# Patient Record
Sex: MALE | Race: WHITE
[De-identification: names, ages, dates, MRNs, and addresses within clinical notes are randomized per-mention and may not be internally consistent; named-entity substitution may affect disease eponyms.]

---

## 2017-03-24 ENCOUNTER — HOSPITAL ENCOUNTER (OUTPATIENT)
Dept: HOSPITAL 80 - FED | Age: 62
Setting detail: OBSERVATION
LOS: 1 days | Discharge: HOME | End: 2017-03-25
Attending: FAMILY MEDICINE | Admitting: INTERNAL MEDICINE
Payer: COMMERCIAL

## 2017-03-24 DIAGNOSIS — K44.9: ICD-10-CM

## 2017-03-24 DIAGNOSIS — R10.13: Primary | ICD-10-CM

## 2017-03-24 DIAGNOSIS — K21.0: ICD-10-CM

## 2017-03-24 DIAGNOSIS — R11.0: ICD-10-CM

## 2017-03-24 LAB
% IMMATURE GRANULYOCYTES: 0.1 % (ref 0–1.1)
ABSOLUTE IMMATURE GRANULOCYTES: 0.01 10^3/UL (ref 0–0.1)
ABSOLUTE NRBC COUNT: 0 10^3/UL (ref 0–0.01)
ADD DIFF?: NO
ADD MORPH?: NO
ADD SCAN?: NO
ALBUMIN SERPL-MCNC: 3.7 G/DL (ref 3.5–5)
ALP SERPL-CCNC: 48 IU/L (ref 38–126)
ALT SERPL-CCNC: 35 IU/L (ref 21–72)
ANION GAP SERPL CALC-SCNC: 10 MEQ/L (ref 8–16)
AST SERPL-CCNC: 23 IU/L (ref 17–59)
ATYPICAL LYMPHOCYTE FLAG: 20 (ref 0–99)
BILIRUB SERPL-MCNC: 0.7 MG/DL (ref 0.1–1.4)
BILIRUBIN-CONJUGATED: 0.4 MG/DL (ref 0–0.5)
BILIRUBIN-UNCONJUGATED: 0.3 MG/DL (ref 0–1.1)
CALCIUM SERPL-MCNC: 9.6 MG/DL (ref 8.5–10.4)
CHLORIDE SERPL-SCNC: 109 MEQ/L (ref 97–110)
CO2 SERPL-SCNC: 19 MEQ/L (ref 22–31)
CREAT SERPL-MCNC: 0.8 MG/DL (ref 0.7–1.3)
ERYTHROCYTE [DISTWIDTH] IN BLOOD BY AUTOMATED COUNT: 12.3 % (ref 11.5–15.2)
FRAGMENT RBC FLAG: 0 (ref 0–99)
GFR SERPL CREATININE-BSD FRML MDRD: > 60 ML/MIN/{1.73_M2}
GLUCOSE SERPL-MCNC: 113 MG/DL (ref 70–100)
HCT VFR BLD CALC: 42.4 % (ref 40–51)
HGB BLD-MCNC: 15.1 G/DL (ref 13.7–17.5)
LEFT SHIFT FLG: 0 (ref 0–99)
LIPEMIA HEMOLYSIS FLAG: 90 (ref 0–99)
MCH RBC BLDCO QN: 31.9 PG (ref 27.9–34.1)
MCHC RBC AUTO-ENTMCNC: 35.6 G/DL (ref 32.4–36.7)
MCV RBC AUTO: 89.6 FL (ref 81.5–99.8)
NRBC-AUTO%: 0 % (ref 0–0.2)
PLATELET # BLD: 293 10^3/UL (ref 150–400)
PLATELET CLUMPS FLAG: 0 (ref 0–99)
PMV BLD AUTO: 10.5 FL (ref 8.7–11.7)
POTASSIUM SERPL-SCNC: 3.6 MEQ/L (ref 3.5–5.2)
PROT SERPL-MCNC: 6.2 G/DL (ref 6.3–8.2)
RBC # BLD AUTO: 4.73 10^6/UL (ref 4.4–6.38)
SODIUM SERPL-SCNC: 138 MEQ/L (ref 134–144)
TROPONIN I SERPL-MCNC: < 0.012 NG/ML (ref 0–0.03)

## 2017-03-24 PROCEDURE — 74177 CT ABD & PELVIS W/CONTRAST: CPT

## 2017-03-24 PROCEDURE — G0378 HOSPITAL OBSERVATION PER HR: HCPCS

## 2017-03-24 PROCEDURE — 93005 ELECTROCARDIOGRAM TRACING: CPT

## 2017-03-24 PROCEDURE — 71020: CPT

## 2017-03-24 NOTE — EDPHY
H & P


Stated Complaint: N and V today, severe GERD, recent GERD med change


Time Seen by Provider: 03/24/17 22:47


HPI/ROS: 





CHIEF COMPLAINT:  Heartburn





HISTORY OF PRESENT ILLNESS:  The patient is a 61-year-old man who comes to the 

emergency department complaining of heartburn and vomiting. He states that he 

has been suffering with this for several months.  He has been worked up by Dr. Granda who performed an upper endoscopy that revealed a small esophageal ulcer.  

They also performed a CT scan of his chest and abdomen which was unremarkable 

except for multiple renal cysts. He was initially on Protonix which helped but 

then stopped working.  He was switched to Nexium and Carafate yesterday.  Every 

time he takes Carafate he becomes very nauseous.  This evening after his 3rd 

dose he began vomiting. He denies any chest pain or shortness of breath. He has 

never had any cardiac workup.  He denies any significant past medical history.  

His wife states that the symptoms do seem to sometimes worsen when he exercises 

but not consistently.








REVIEW OF SYSTEMS:


Constitutional:  denies: chills, fever, recent illness, recent injury


EENTM: denies: blurred vision, double vision, nose congestion


Respiratory: denies: cough, shortness of breath


Cardiac: denies: chest pain, irregular heart rate, lightheadedness, palpitations


Gastrointestinal/Abdominal:  See HPI


Genitourinary: denies: dysuria, frequency, hematuria, pain


Musculoskeletal: denies: joint pain, muscle pain


Skin: denies: lesions, rash, jaundice, bruising


Neurological: denies: headache, numbness, paresthesia, tingling, dizziness, 

weakness


Hematologic/Lymphatic: denies: blood clots, easy bleeding, easy bruising


Immunologic/allergic: denies: HIV/AIDS, transplant








EXAM:


GENERAL:  Mild distress, nauseous


HEAD:  Atraumatic, normocephalic.


EYES:  Pupils equal round and reactive to light, extraocular movements intact, 

sclera anicteric, conjunctiva are normal.


ENT:  TMs normal, nares patent, oropharynx clear without exudates.  Moist 

mucous membranes.


NECK:  Normal range of motion, supple without lymphadenopathy or JVD.


LUNGS:  Breath sounds clear to auscultation bilaterally and equal.  No wheezes 

rales or rhonchi.


HEART:  Regular rate and rhythm without murmurs, rubs or gallops.


ABDOMEN:  Soft, nontender, normoactive bowel sounds.  No guarding, no rebound.  

No masses appreciated.


BACK:  No CVA tenderness, no spinal tenderness, step-offs or deformities


EXTREMITIES:  Normal range of motion, no pitting or edema.  No clubbing or 

cyanosis.


NEUROLOGICAL:  Cranial nerves II through XII grossly intact.  Normal speech, 

normal gait.  5/5 strength, normal movement in all extremities, normal sensation


PSYCH:  Normal mood, normal affect.


SKIN:  Warm, dry, normal turgor, no visible rashes or lesions.








Source: Patient


Exam Limitations: No limitations





- Personal History


Current Tetanus/Diphtheria Vaccine: Yes


Current Tetanus Diphtheria and Acellular Pertussis (TDAP): Yes


Tetanus Vaccine Date: > 10





- Medical/Surgical History


Hx Asthma: No


Hx Chronic Respiratory Disease: No


Hx Diabetes: No


Hx Cardiac Disease: No


Hx Renal Disease: No


Hx Cirrhosis: No


Hx Alcoholism: No


Hx HIV/AIDS: No


Hx Splenectomy or Spleen Trauma: No


Other PMH: LEFT KNEE SURGERY.  GERD





- Family History


Significant Family History: No pertinent family hx





- Social History


Smoking Status: Never smoked


Alcohol Use: Sober


Drug Use: None


Constitutional: 


 Initial Vital Signs











Temperature (C)  36.3 C   03/24/17 22:17


 


Heart Rate  70   03/24/17 22:17


 


Respiratory Rate  14   03/24/17 22:17


 


Blood Pressure  155/87 H  03/24/17 22:17


 


O2 Sat (%)  97   03/24/17 22:17








 











O2 Delivery Mode               Room Air














Allergies/Adverse Reactions: 


 





No Known Allergies Allergy (Unverified 06/21/15 02:45)


 








Home Medications: 














 Medication  Instructions  Recorded


 


Carafate 1 GM (*)  03/24/17


 


Nexium  03/24/17














Medical Decision Making





- Diagnostics


EKG Interpretation: 





An EKG obtained and was read and documented in trace view.  Please see trace 

view for full reading and report.  Sinus rhythm, no acute ischemic changes 


Imaging: 





X-ray: chest x-ray  was obtained.  I viewed the images myself on the PACS 

system.  My interpretation of the images is:  negative for acute disease .  The 

radiologist interpretation is negative for acute disease.





Results:


CT scan of the ABDOMEN PELVIS was obtained.  The results of the study are 

MULTIPLE CYST, NON RUPTURED, HIATAL HERNIA, UNCHANGED FROM PREVIOUS.  The study 

was read by Dr. Peacock.  I viewed the images myself on the PACS system.


ED Course/Re-evaluation: 





The patient has been thoroughly worked up for his GI symptoms with no specific 

cause found.  I will treat him symptomatically here tonight and rule out 

cardiac disease as best as possible.  He does not think this is necessary but 

is appreciative for the symptomatic treatment.





12:15 a.m. we discussed the patient's lab, EKG and chest x-ray results which 

are reassuring.  He has had no relief of his heartburn and nausea after Zofran 

and Reglan.  He will not take the GI cocktail because he does not feel that he 

can drink without vomiting.  I will try IV Haldol and IV Pepcid. 





3:00 a.m. the patient is continuing to have pain and indigestion.  None of the 

medications we have given him have helped.  I offered admission.  We discussed 

his workup thus far further.  It has been about 6 months since his last CT 

scan.  He has very large cysts up to 11 cm in his kidney and liver.  It could 

be the 1 of them is ruptured or that causing some type of obstruction.  I will 

obtain a new CT scan.  I did offer admission at this point he is declining.





For in the patient's pain is not controlled.  We discussed the CT results which 

are reassuring.  No evidence of abscess or cyst rupture or obstruction et 

cetera.  I will admit him for uncontrolled pain. I suspect the etiology maybe 

his hiatal hernia.





4:15 a.m. I discussed the case with Dr. Boateng who will admit to the medical 

service.


Differential Diagnosis: 





Partial list of the Differential diagnosis considered include but were not 

limited to;  ruptured cyst, peptic ulcer disease, hiatal hernia and although 

unlikely based on the history and physical exam, I also considered acute 

coronary disease, PE, pneumonia, obstruction.  








- Data Points


Laboratory Results: 


 Laboratory Results





 03/24/17 22:55 





 03/24/17 22:55 





 











  03/24/17 03/24/17





  22:55 22:55


 


WBC    7.15 10^3/uL 10^3/uL





    (3.80-9.50) 


 


RBC    4.73 10^6/uL 10^6/uL





    (4.40-6.38) 


 


Hgb    15.1 g/dL g/dL





    (13.7-17.5) 


 


Hct    42.4 % %





    (40.0-51.0) 


 


MCV    89.6 fL fL





    (81.5-99.8) 


 


MCH    31.9 pg pg





    (27.9-34.1) 


 


MCHC    35.6 g/dL g/dL





    (32.4-36.7) 


 


RDW    12.3 % %





    (11.5-15.2) 


 


Plt Count    293 10^3/uL 10^3/uL





    (150-400) 


 


MPV    10.5 fL fL





    (8.7-11.7) 


 


Neut % (Auto)    48.8 % %





    (39.3-74.2) 


 


Lymph % (Auto)    39.0 % %





    (15.0-45.0) 


 


Mono % (Auto)    10.1 % %





    (4.5-13.0) 


 


Eos % (Auto)    1.3 % %





    (0.6-7.6) 


 


Baso % (Auto)    0.7 % %





    (0.3-1.7) 


 


Nucleat RBC Rel Count    0.0 % %





    (0.0-0.2) 


 


Absolute Neuts (auto)    3.49 10^3/uL 10^3/uL





    (1.70-6.50) 


 


Absolute Lymphs (auto)    2.79 10^3/uL 10^3/uL





    (1.00-3.00) 


 


Absolute Monos (auto)    0.72 10^3/uL 10^3/uL





    (0.30-0.80) 


 


Absolute Eos (auto)    0.09 10^3/uL 10^3/uL





    (0.03-0.40) 


 


Absolute Basos (auto)    0.05 10^3/uL 10^3/uL





    (0.02-0.10) 


 


Absolute Nucleated RBC    0.00 10^3/uL 10^3/uL





    (0-0.01) 


 


Immature Gran %    0.1 % %





    (0.0-1.1) 


 


Immature Gran #    0.01 10^3/uL 10^3/uL





    (0.00-0.10) 


 


Sodium  138 mEq/L mEq/L  





   (134-144)  


 


Potassium  3.6 mEq/L mEq/L  





   (3.5-5.2)  


 


Chloride  109 mEq/L mEq/L  





   ()  


 


Carbon Dioxide  19 mEq/l L mEq/l  





   (22-31)  


 


Anion Gap  10 mEq/L mEq/L  





   (8-16)  


 


BUN  17 mg/dL mg/dL  





   (7-23)  


 


Creatinine  0.8 mg/dL mg/dL  





   (0.7-1.3)  


 


Estimated GFR  > 60   





   


 


Glucose  113 mg/dL H mg/dL  





   ()  


 


Calcium  9.6 mg/dL mg/dL  





   (8.5-10.4)  


 


Total Bilirubin  0.7 mg/dL mg/dL  





   (0.1-1.4)  


 


Conjugated Bilirubin  0.4 mg/dL mg/dL  





   (0.0-0.5)  


 


Unconjugated Bilirubin  0.3 mg/dL mg/dL  





   (0.0-1.1)  


 


AST  23 IU/L IU/L  





   (17-59)  


 


ALT  35 IU/L IU/L  





   (21-72)  


 


Alkaline Phosphatase  48 IU/L IU/L  





   ()  


 


Troponin I  < 0.012 ng/mL ng/mL  





   (0-0.034)  


 


Total Protein  6.2 g/dL L g/dL  





   (6.3-8.2)  


 


Albumin  3.7 g/dL g/dL  





   (3.5-5.0)  


 


Lipase  155.0 IU/L IU/L  





   ()  











Medications Given: 


 








Discontinued Medications





Al Hydroxide/Mg Hydroxide (Maalox Susp)  30 ml PO ONCE ONE


   Stop: 03/24/17 23:03


   Last Admin: 03/25/17 02:15 Dose:  30 ml


Famotidine (Pepcid)  20 mg IVP EDNOW ONE


   Stop: 03/25/17 00:19


   Last Admin: 03/25/17 00:25 Dose:  20 mg


Haloperidol Lactate (Haldol Injection)  5 mg IVP EDNOW ONE


   Stop: 03/25/17 00:18


   Last Admin: 03/25/17 00:25 Dose:  5 mg


Hydromorphone HCl (Dilaudid)  0.5 mg IVP EDNOW ONE


   Stop: 03/24/17 23:25


   Last Admin: 03/24/17 23:25 Dose:  0.5 mg


Hydromorphone HCl (Dilaudid)  1 mg IVP EDNOW ONE


   Stop: 03/25/17 04:09


   Last Admin: 03/25/17 04:10 Dose:  1 mg


Hyoscyamine Sulfate (Levsin, Hyomax-Sl)  0.25 mg PO ONCE ONE


   Stop: 03/24/17 23:03


   Last Admin: 03/25/17 02:15 Dose:  0.25 mg


Lidocaine (Lidocaine 2% Viscous)  15 ml PO ONCE ONE


   Stop: 03/24/17 23:03


   Last Admin: 03/25/17 02:15 Dose:  15 ml


Metoclopramide HCl (Reglan Injection)  10 mg IVP EDNOW ONE


   Stop: 03/24/17 23:11


   Last Admin: 03/24/17 23:14 Dose:  10 mg


Ondansetron HCl (Zofran)  8 mg IVP EDNOW ONE


   Stop: 03/24/17 23:03


   Last Admin: 03/24/17 23:05 Dose:  8 mg








Departure





- Departure


Disposition: [facility] Acute


Clinical Impression: 


Abdominal pain


Qualifiers:


 Abdominal location: epigastric Qualified Code(s): R10.13 - Epigastric pain





Condition: Fair

## 2017-03-24 NOTE — CPEKG
Heart Rate: 64

RR Interval: 938

P-R Interval: 168

QRSD Interval: 108

QT Interval: 464

QTC Interval: 479

P Axis: 72

QRS Axis: 71

T Wave Axis: 46

EKG Severity - BORDERLINE ECG -

EKG Impression: SINUS RHYTHM

EKG Impression: BORDERLINE PROLONGED QT INTERVAL

Electronically Signed By: Arias Lainez 24-Mar-2017 23:12:49

## 2017-03-25 VITALS
HEART RATE: 61 BPM | SYSTOLIC BLOOD PRESSURE: 134 MMHG | OXYGEN SATURATION: 94 % | DIASTOLIC BLOOD PRESSURE: 73 MMHG | TEMPERATURE: 98.4 F

## 2017-03-25 VITALS — RESPIRATION RATE: 16 BRPM

## 2017-03-25 LAB
% IMMATURE GRANULYOCYTES: 0.3 % (ref 0–1.1)
ABSOLUTE IMMATURE GRANULOCYTES: 0.03 10^3/UL (ref 0–0.1)
ABSOLUTE NRBC COUNT: 0 10^3/UL (ref 0–0.01)
ADD DIFF?: NO
ADD MORPH?: NO
ADD SCAN?: NO
ALBUMIN SERPL-MCNC: 4.1 G/DL (ref 3.5–5)
ALP SERPL-CCNC: 51 IU/L (ref 38–126)
ALT SERPL-CCNC: 29 IU/L (ref 21–72)
ANION GAP SERPL CALC-SCNC: 11 MEQ/L (ref 8–16)
AST SERPL-CCNC: 16 IU/L (ref 17–59)
ATYPICAL LYMPHOCYTE FLAG: 0 (ref 0–99)
BILIRUB SERPL-MCNC: 0.9 MG/DL (ref 0.1–1.4)
CALCIUM SERPL-MCNC: 9.5 MG/DL (ref 8.5–10.4)
CHLORIDE SERPL-SCNC: 106 MEQ/L (ref 97–110)
CO2 SERPL-SCNC: 25 MEQ/L (ref 22–31)
CREAT SERPL-MCNC: 1 MG/DL (ref 0.7–1.3)
ERYTHROCYTE [DISTWIDTH] IN BLOOD BY AUTOMATED COUNT: 12.8 % (ref 11.5–15.2)
FRAGMENT RBC FLAG: 0 (ref 0–99)
GFR SERPL CREATININE-BSD FRML MDRD: > 60 ML/MIN/{1.73_M2}
GLUCOSE SERPL-MCNC: 111 MG/DL (ref 70–100)
HCT VFR BLD CALC: 45.7 % (ref 40–51)
HGB BLD-MCNC: 15 G/DL (ref 13.7–17.5)
LEFT SHIFT FLG: 0 (ref 0–99)
LIPEMIA HEMOLYSIS FLAG: 80 (ref 0–99)
MAGNESIUM SERPL-MCNC: 2.1 MG/DL (ref 1.6–2.3)
MCH RBC BLDCO QN: 30.2 PG (ref 27.9–34.1)
MCHC RBC AUTO-ENTMCNC: 32.8 G/DL (ref 32.4–36.7)
MCV RBC AUTO: 92.1 FL (ref 81.5–99.8)
NRBC-AUTO%: 0 % (ref 0–0.2)
PLATELET # BLD: 243 10^3/UL (ref 150–400)
PLATELET CLUMPS FLAG: 0 (ref 0–99)
PMV BLD AUTO: 9.3 FL (ref 8.7–11.7)
POTASSIUM SERPL-SCNC: 4.7 MEQ/L (ref 3.5–5.2)
PROT SERPL-MCNC: 6.8 G/DL (ref 6.3–8.2)
RBC # BLD AUTO: 4.96 10^6/UL (ref 4.4–6.38)
SODIUM SERPL-SCNC: 142 MEQ/L (ref 134–144)
TROPONIN I SERPL-MCNC: < 0.012 NG/ML (ref 0–0.03)

## 2017-03-25 NOTE — PDGENHP
History and Physical





- Chief Complaint


epigastric pain, nausea





- History of Present Illness


Patient is a 61-year-old male with a small hiatal hernia, esophagitis who 

presents to the ED with an acute exacerbation of his chronic epigastric pain 

and nausea. Patient has been following with GI Dr. Granda for the past several 

months for recurrent/intermittent severe epigastric pain (since 9/2016). He has 

undergone CT abd/pelvis, which showed small hiatal hernia but was largely 

unrevealing for a source of his symptoms. He has also had upper GI series in 10/

2016 and EGD in 1/2017 that showed mild esophagitis, but no gastric outlet 

obstruction or significant PUD. He was initiated on a PPI with general 

improvement in his symptoms, until about 2 weeks ago when his pain and nausea 

began to recur. On 3/24, he went to his PMD for evaluation and was prescribed 

nexium and Carafate and patient reports shortly after taking these he began 

having intense nausea and pain. The pain was intermittent in quality, but at it'

s worst was 8-10/10 in intensity and associated with hypersalivation and severe 

nausea, but no vomiting. He also reports decreased PO intake over the past week

, associated with weight loss. He reports normal BMs.





On arrival to the ED patient was afebrile hemodynamically stable, although in 

intense pain. He was given several rounds of pain medications, antiemetics with 

no relief of symptoms.  Labs including CBC, CMP and troponin were within normal 

limits.  EKG was also unremarkable for any acute ischemic changes.  Chest x-ray 

did not reveal any abnormality.  CT of the abdomen and pelvis was obtained and 

showed no acute pathology.  Given his intractable symptoms, he was admitted to 

the hospitalist service for further management.





History Information





- Allergies/Home Medication List


Allergies/Adverse Reactions: 








No Known Allergies Allergy (Unverified 06/21/15 02:45)


 





Home Medications: 








Carafate 1 GM (*)  03/24/17 [Last Taken Unknown]


Nexium  03/24/17 [Last Taken Unknown]





I have personally reviewed and updated: family history, medical history, social 

history, surgical history





- Past Medical History


Additional medical history: esophagitis





- Surgical History


Additional surgical history: knee surgeries





- Family History


Additional family history: M: SLE.  F: colon ca





- Social History


Smoking Status: Never smoked


Alcohol Use: Occasionally (1-2 glasses nightly)


Drug Use: None


Additional social history: Patient lives with his wife, is retired.





Review of Systems


ROS: 10pt was reviewed & negative except for what was stated in HPI & below





Physical Exam














Temp Pulse Resp BP Pulse Ox


 


 36.7 C   60   16   126/64 H  95 


 


 03/25/17 05:15  03/25/17 05:15  03/25/17 05:15  03/25/17 05:15  03/25/17 05:15




















O2 (L/minute)                  2.5














Constitutional: appears nourished, uncomfortable


Eyes: PERRL, anicteric sclera, EOMI


Ears, Nose, Mouth, Throat: hearing normal, ears appear normal, no oral mucosal 

ulcers, dry mucous membranes


Cardiovascular: regular rate and rhythym, no murmur, rub, or gallop, pulses 

symmetric bilaterally, No JVD, No edema


Peripheral Pulses: 2+: dorsalis-pedis (R), dorsalis-pedis (L)


Respiratory: no respiratory distress, no rales or rhonchi, clear to auscultation


Gastrointestinal: normoactive bowel sounds, soft, non-tender abdomen, no 

palpable masses, tenderness (in epigastrum), guarding, No rebound


Genitourinary: no bladder fullness, no bladder tenderness


Skin: warm, normal color, no rashes or abrasions, no fluctuance, no induration, 

No mottled


Musculoskeletal: full muscle strength, no muscle tenderness, normal joint ROM, 

no joint effusions


Neurologic: AAOx3, sensation intact bilaterally, CN II-XII Intact, No weakness, 

No numbness, No facial droop


Psychiatric: interacting appropriately, not anxious, not encephalopathic, 

thought process linear





Lab Data & Imaging Review





 03/24/17 22:55





 03/24/17 22:55














WBC  7.15 10^3/uL (3.80-9.50)   03/24/17  22:55    


 


RBC  4.73 10^6/uL (4.40-6.38)   03/24/17  22:55    


 


Hgb  15.1 g/dL (13.7-17.5)   03/24/17  22:55    


 


Hct  42.4 % (40.0-51.0)   03/24/17  22:55    


 


MCV  89.6 fL (81.5-99.8)   03/24/17  22:55    


 


MCH  31.9 pg (27.9-34.1)   03/24/17  22:55    


 


MCHC  35.6 g/dL (32.4-36.7)   03/24/17  22:55    


 


RDW  12.3 % (11.5-15.2)   03/24/17  22:55    


 


Plt Count  293 10^3/uL (150-400)   03/24/17  22:55    


 


MPV  10.5 fL (8.7-11.7)   03/24/17  22:55    


 


Neut % (Auto)  48.8 % (39.3-74.2)   03/24/17  22:55    


 


Lymph % (Auto)  39.0 % (15.0-45.0)   03/24/17  22:55    


 


Mono % (Auto)  10.1 % (4.5-13.0)   03/24/17  22:55    


 


Eos % (Auto)  1.3 % (0.6-7.6)   03/24/17 22:55    


 


Baso % (Auto)  0.7 % (0.3-1.7)   03/24/17  22:55    


 


Nucleat RBC Rel Count  0.0 % (0.0-0.2)   03/24/17  22:55    


 


Absolute Neuts (auto)  3.49 10^3/uL (1.70-6.50)   03/24/17  22:55    


 


Absolute Lymphs (auto)  2.79 10^3/uL (1.00-3.00)   03/24/17  22:55    


 


Absolute Monos (auto)  0.72 10^3/uL (0.30-0.80)   03/24/17  22:55    


 


Absolute Eos (auto)  0.09 10^3/uL (0.03-0.40)   03/24/17  22:55    


 


Absolute Basos (auto)  0.05 10^3/uL (0.02-0.10)   03/24/17 22:55    


 


Absolute Nucleated RBC  0.00 10^3/uL (0-0.01)   03/24/17  22:55    


 


Immature Gran %  0.1 % (0.0-1.1)   03/24/17 22:55    


 


Immature Gran #  0.01 10^3/uL (0.00-0.10)   03/24/17  22:55    


 


Sodium  138 mEq/L (134-144)   03/24/17  22:55    


 


Potassium  3.6 mEq/L (3.5-5.2)   03/24/17  22:55    


 


Chloride  109 mEq/L ()   03/24/17  22:55    


 


Carbon Dioxide  19 mEq/l (22-31)  L  03/24/17  22:55    


 


Anion Gap  10 mEq/L (8-16)   03/24/17  22:55    


 


BUN  17 mg/dL (7-23)   03/24/17  22:55    


 


Creatinine  0.8 mg/dL (0.7-1.3)   03/24/17  22:55    


 


Estimated GFR  > 60   03/24/17  22:55    


 


Glucose  113 mg/dL ()  H  03/24/17  22:55    


 


Calcium  9.6 mg/dL (8.5-10.4)   03/24/17  22:55    


 


Total Bilirubin  0.7 mg/dL (0.1-1.4)   03/24/17  22:55    


 


Conjugated Bilirubin  0.4 mg/dL (0.0-0.5)   03/24/17  22:55    


 


Unconjugated Bilirubin  0.3 mg/dL (0.0-1.1)   03/24/17  22:55    


 


AST  23 IU/L (17-59)   03/24/17  22:55    


 


ALT  35 IU/L (21-72)   03/24/17  22:55    


 


Alkaline Phosphatase  48 IU/L ()   03/24/17  22:55    


 


Troponin I  < 0.012 ng/mL (0-0.034)   03/24/17  22:55    


 


Total Protein  6.2 g/dL (6.3-8.2)  L  03/24/17  22:55    


 


Albumin  3.7 g/dL (3.5-5.0)   03/24/17  22:55    


 


Lipase  155.0 IU/L ()   03/24/17  22:55    








Visualized and Interpreted Chest x-ray results: Yes


Chest X-Ray results: no infiltrate, normal


Visualized and Interpreted imaging results: Yes


Interpretation: CT abd/pelvis: hepatic and renal cysts unchanged from priors; 

no acute findings


Visualized and Interpreted EKG results: Yes


EKG Interpretation: Positive for: normal sinsus rhythm





Assessment & Plan


Assessment: 


Patient is a 61 year old male with history of esophagitis and a small hiatal 

who presents to the ED with acute, intense epigastric pain and nausea.





Plan: 





# epigastric pain and nausea


Etiology unclear, but likely related to known diagnosis of esophagitis. Normal 

EKG and negative troponin make cardiac etiology unlikely. Comprehensive met 

panel and electrolytes are all normal, including lipase. CT abd/pelvis does not 

reveal any acute findings that would explain his symptoms. Recent outpatient 

H.pylori screen was negative. Will continue symptomatic treatment with PPI, anti

-emetics and maalox. GI has also been contacted.





# dispo: admit to observation for symptom control





# gen:


NPO


DVT ppx: lovenox


Full code

## 2017-03-25 NOTE — SOAPPROG
SOAP Progress Note


Assessment/Plan: 


Assessment:


























Plan:





03/25/17 09:55


A/P


1. Nausea- with epigastric pain. Secondary to reflux versus PUD versus other? 

EGD in Dec 2016 with LA grade esophagitis. Currently feeling better.   

Recommend full dose PPI BID. Will start clear liquid diet.  See dictated 

consult for details. 








Objective: 





 Vital Signs











Temp Pulse Resp BP Pulse Ox


 


 36.9 C   61   16   134/73 H  94 


 


 03/25/17 07:03  03/25/17 07:03  03/25/17 07:03  03/25/17 07:03  03/25/17 07:03








 











 03/24/17 03/25/17 03/26/17





 05:59 05:59 05:59


 


Intake Total  2135 


 


Balance  2135 














ICD10 Worksheet


Patient Problems: 


 Problems











Problem Status Onset


 


Abdominal pain Acute

## 2017-03-25 NOTE — GDS
[f rep st]



                                                             DISCHARGE SUMMARY





DISCHARGE DIAGNOSIS:  Nausea with epigastric pain, most likely due to reflux versus peptic ulcer dis
ease.



CONSULTANTS:  Serge Bennett MD, [facility].



HOSPITAL COURSE AND STAY:  Nausea, vomiting and abdominal pain:  The patient was [facility] to the [facility] [place] after receiving Dilaudid, Reglan, Zofran, and Haldol in the emergency department that did not 
initially help.  The patient was subsequently placed on observation on [address].  During the time of m
y exam around 10 in the morning on the 25th, the patient's abdominal pain and vomiting had resolved,
 and he was able to tolerate clear liquids.  The patient requested to be discharged home.



DISCHARGE PHYSICAL EXAMINATION:  VITAL SIGNS:  Blood pressure 134/73, pulse 61, respiratory rate 16,
 O2 saturation 94% on 2.5 L.  Temperature afebrile.  GENERAL:  No acute distress.  ABDOMEN:  Soft, n
ontender, nondistended.  No guarding or rebound tenderness.  Normoactive bowel sounds.



LABORATORY DATA AND STUDIES:  CT of the abdomen done 03/25/2017, refer to report.



DISCHARGE MEDICATIONS:  Please refer to discharge medication reconciliation in [facility] for full det
ails.  Below is a preliminary list. 



New medications on hospital discharge, Nexium 40 mg p.o. twice daily, Zofran 4 mg p.o. q.6 hours p.r
.n. nausea and vomiting, prescription for #5 was given, Phenergan 25 mg suppository if Zofran is ine
ffective.



DISCHARGE INSTRUCTIONS:  The patient was discharged home where he should follow up with his primary 
care provider and gastroenterologist.  He was instructed to seek medical attention if his symptoms r
eturn, and he is unable to tolerate fluids or solids.





Job #:  [id number]

## 2017-05-09 ENCOUNTER — HOSPITAL ENCOUNTER (INPATIENT)
Dept: HOSPITAL 80 - FED | Age: 62
LOS: 7 days | Discharge: HOME | DRG: 246 | End: 2017-05-16
Attending: INTERNAL MEDICINE | Admitting: HOSPITALIST
Payer: COMMERCIAL

## 2017-05-09 DIAGNOSIS — I48.0: ICD-10-CM

## 2017-05-09 DIAGNOSIS — I95.9: ICD-10-CM

## 2017-05-09 DIAGNOSIS — N20.0: ICD-10-CM

## 2017-05-09 DIAGNOSIS — Z87.891: ICD-10-CM

## 2017-05-09 DIAGNOSIS — R55: ICD-10-CM

## 2017-05-09 DIAGNOSIS — K44.9: ICD-10-CM

## 2017-05-09 DIAGNOSIS — R11.0: ICD-10-CM

## 2017-05-09 DIAGNOSIS — Z80.0: ICD-10-CM

## 2017-05-09 DIAGNOSIS — N28.1: ICD-10-CM

## 2017-05-09 DIAGNOSIS — I25.118: Primary | ICD-10-CM

## 2017-05-09 DIAGNOSIS — R93.0: ICD-10-CM

## 2017-05-09 DIAGNOSIS — T82.528A: ICD-10-CM

## 2017-05-09 DIAGNOSIS — R94.39: ICD-10-CM

## 2017-05-09 DIAGNOSIS — G43.809: ICD-10-CM

## 2017-05-09 DIAGNOSIS — I10: ICD-10-CM

## 2017-05-09 DIAGNOSIS — I63.431: ICD-10-CM

## 2017-05-09 DIAGNOSIS — K21.0: ICD-10-CM

## 2017-05-09 LAB
% IMMATURE GRANULYOCYTES: 0.2 % (ref 0–1.1)
ABSOLUTE IMMATURE GRANULOCYTES: 0.02 10^3/UL (ref 0–0.1)
ABSOLUTE NRBC COUNT: 0 10^3/UL (ref 0–0.01)
ADD DIFF?: NO
ADD MORPH?: NO
ADD SCAN?: NO
ALBUMIN SERPL-MCNC: 4.8 G/DL (ref 3.5–5)
ALP SERPL-CCNC: 73 IU/L (ref 38–126)
ALT SERPL-CCNC: 38 IU/L (ref 21–72)
ANION GAP SERPL CALC-SCNC: 15 MEQ/L (ref 8–16)
AST SERPL-CCNC: 28 IU/L (ref 17–59)
ATYPICAL LYMPHOCYTE FLAG: 0 (ref 0–99)
BILIRUB SERPL-MCNC: 1.1 MG/DL (ref 0.1–1.4)
BILIRUBIN-CONJUGATED: 0.5 MG/DL (ref 0–0.5)
BILIRUBIN-UNCONJUGATED: 0.6 MG/DL (ref 0–1.1)
CALCIUM SERPL-MCNC: 10.2 MG/DL (ref 8.5–10.4)
CHLORIDE SERPL-SCNC: 103 MEQ/L (ref 97–110)
CO2 SERPL-SCNC: 22 MEQ/L (ref 22–31)
COLOR UR: YELLOW
CREAT SERPL-MCNC: 0.9 MG/DL (ref 0.7–1.3)
ERYTHROCYTE [DISTWIDTH] IN BLOOD BY AUTOMATED COUNT: 12.8 % (ref 11.5–15.2)
FRAGMENT RBC FLAG: 0 (ref 0–99)
GFR SERPL CREATININE-BSD FRML MDRD: > 60 ML/MIN/{1.73_M2}
GLUCOSE SERPL-MCNC: 136 MG/DL (ref 70–100)
HCT VFR BLD CALC: 49.2 % (ref 40–51)
HGB BLD-MCNC: 16.8 G/DL (ref 13.7–17.5)
LEFT SHIFT FLG: 0 (ref 0–99)
LIPEMIA HEMOLYSIS FLAG: 90 (ref 0–99)
MCH RBC BLDCO QN: 31 PG (ref 27.9–34.1)
MCHC RBC AUTO-ENTMCNC: 34.1 G/DL (ref 32.4–36.7)
MCV RBC AUTO: 90.8 FL (ref 81.5–99.8)
MUCOUS THREADS #/AREA URNS LPF: (no result) /LPF
NITRITE UR QL STRIP: NEGATIVE
NRBC-AUTO%: 0 % (ref 0–0.2)
PH UR STRIP: 5 [PH] (ref 5–7.5)
PLATELET # BLD: 274 10^3/UL (ref 150–400)
PLATELET CLUMPS FLAG: 0 (ref 0–99)
PMV BLD AUTO: 9.3 FL (ref 8.7–11.7)
POTASSIUM SERPL-SCNC: 3.9 MEQ/L (ref 3.5–5.2)
PROT SERPL-MCNC: 8.2 G/DL (ref 6.3–8.2)
RBC # BLD AUTO: 5.42 10^6/UL (ref 4.4–6.38)
RBC #/AREA URNS HPF: (no result) /HPF (ref 0–3)
SODIUM SERPL-SCNC: 140 MEQ/L (ref 134–144)
SP GR UR STRIP: 1.02 (ref 1–1.03)
TROPONIN I SERPL-MCNC: < 0.012 NG/ML (ref 0–0.03)
WBC #/AREA URNS HPF: (no result) /HPF (ref 0–3)

## 2017-05-09 PROCEDURE — C9600 PERC DRUG-EL COR STENT SING: HCPCS

## 2017-05-09 PROCEDURE — A9541 TC99M SULFUR COLLOID: HCPCS

## 2017-05-09 PROCEDURE — C1753 CATH, INTRAVAS ULTRASOUND: HCPCS

## 2017-05-09 PROCEDURE — C1769 GUIDE WIRE: HCPCS

## 2017-05-09 PROCEDURE — A9585 GADOBUTROL INJECTION: HCPCS

## 2017-05-09 PROCEDURE — C1887 CATHETER, GUIDING: HCPCS

## 2017-05-09 PROCEDURE — A9500 TC99M SESTAMIBI: HCPCS

## 2017-05-09 PROCEDURE — C1874 STENT, COATED/COV W/DEL SYS: HCPCS

## 2017-05-09 PROCEDURE — C1725 CATH, TRANSLUMIN NON-LASER: HCPCS

## 2017-05-09 PROCEDURE — G0378 HOSPITAL OBSERVATION PER HR: HCPCS

## 2017-05-09 RX ADMIN — SODIUM CHLORIDE AND POTASSIUM CHLORIDE SCH MLS: 9; 1.49 INJECTION, SOLUTION INTRAVENOUS at 15:39

## 2017-05-09 RX ADMIN — Medication SCH MLS: at 15:39

## 2017-05-09 RX ADMIN — SODIUM CHLORIDE AND POTASSIUM CHLORIDE SCH MLS: 9; 1.49 INJECTION, SOLUTION INTRAVENOUS at 20:35

## 2017-05-09 RX ADMIN — Medication SCH MLS: at 20:35

## 2017-05-09 RX ADMIN — PANTOPRAZOLE SODIUM SCH MG: 40 TABLET, DELAYED RELEASE ORAL at 20:36

## 2017-05-09 RX ADMIN — ONDANSETRON PRN MG: 2 SOLUTION INTRAMUSCULAR; INTRAVENOUS at 15:38

## 2017-05-09 NOTE — PDGENHP
History and Physical





- Chief Complaint


Acute nausea





- History of Present Illness


 PCP:  Dr. Bagley


Primary gastroenterologist Dr. Granda





HPI:  61-year-old male presenting with acute nausea characterized as a retching 

sensation with associated vomiting, onset of symptoms around 6:00 p.m. on the 

evening prior to this presentation and duration persistently worsening 

thereafter.  Symptoms are exacerbated by oral intake and are somewhat 

alleviated by inducing himself to vomit.  The patient reports that his symptoms 

have not been controlled by Zofran or Phenergan at home.  He has been adherent 

to his PPI twice daily.  He reports that his last bowel movement was several 

days ago but he does note associated anorexia over the past several days as 

well.  The patient did have similar symptoms but of lesser severity 

approximately 48 hours ago and these were self-resolved.  Since the patient's 

most recent hospitalization in March 2017, he has attempted to make lifestyle 

modifications as advised.  He has reduced his alcohol intake to no more than 2 

glasses of wine per night, no more coffee than 1 cup daily, no soda, no spicy 

foods.  He does report that he exercises regularly and has not been 

experiencing any chest pain or exertional shortness of breath.  His wife does 

note that historically he has experienced episodes of nausea more frequently 

when they are exerting themselves, namely hiking or engaging and other outdoor 

activities.





History Information





- Allergies/Home Medication List


Allergies/Adverse Reactions: 








No Known Allergies Allergy (Verified 05/09/17 11:54)


 





Home Medications: 








Esomeprazole Mag Trihydrate [Nexium] 40 mg PO BID 05/09/17 [Last Taken 05/08/17 

21:00]


Ondansetron Odt [Zofran Odt 4 mg (*)] 4 mg PO Q4HRS PRN 05/09/17 [Last Taken 05/ 08/17 21:00]


Promethazine HCl [Phenergan 25mg (*)] 25 mg PO Q6HRS PRN 05/09/17 [Last Taken 05 /08/17 22:00]





I have personally reviewed and updated: family history, medical history, social 

history, surgical history





- Past Medical History


Additional medical history: Esophagitis/GERD induced nausea/vomiting.  

Nephrolithiasis with uric acid and calcium oxalate stones.  Hiatal hernia





- Surgical History


Additional surgical history: Knee surgeries.  Most recent colonoscopy within 

the past year was reportedly normal.  12/16 EGD demonstrating esophagitis





- Family History


Additional family history: M: SLE.  F: colon ca





- Social History


Smoking Status: Never smoked


Alcohol Use: Occasionally (approximately 1-2 glasses of wine nightly)


Drug Use: None


Additional social history: Patient lives with his wife, is retired.





Review of Systems


ROS: 10pt was reviewed & negative except for what was stated in HPI & below


Gastrointestinal: Reports: vomitting, nausea





Physical Exam














Temp Pulse Resp BP Pulse Ox


 


 36.7 C   62   18   167/82 H  98 


 


 05/09/17 14:57  05/09/17 14:57  05/09/17 14:57  05/09/17 14:57  05/09/17 14:57











Constitutional: no apparent distress, appears nourished, not in pain, 

uncomfortable


Eyes: PERRL, anicteric sclera, EOMI


Ears, Nose, Mouth, Throat: moist mucous membranes, hearing normal, ears appear 

normal, no oral mucosal ulcers


Cardiovascular: regular rate and rhythym, no murmur, rub, or gallop, No edema


Respiratory: no respiratory distress, no rales or rhonchi, clear to auscultation


Gastrointestinal: normoactive bowel sounds, soft, non-tender abdomen, no 

palpable masses, No distension


Skin: warm, normal color


Neurologic: AAOx3, No facial droop


Psychiatric: interacting appropriately, not anxious, not encephalopathic, 

thought process linear





Lab Data & Imaging Review





 05/09/17 12:10





 05/09/17 12:10














WBC  8.39 10^3/uL (3.80-9.50)   05/09/17  12:10    


 


RBC  5.42 10^6/uL (4.40-6.38)   05/09/17  12:10    


 


Hgb  16.8 g/dL (13.7-17.5)   05/09/17  12:10    


 


Hct  49.2 % (40.0-51.0)   05/09/17  12:10    


 


MCV  90.8 fL (81.5-99.8)   05/09/17  12:10    


 


MCH  31.0 pg (27.9-34.1)   05/09/17  12:10    


 


MCHC  34.1 g/dL (32.4-36.7)   05/09/17  12:10    


 


RDW  12.8 % (11.5-15.2)   05/09/17  12:10    


 


Plt Count  274 10^3/uL (150-400)   05/09/17  12:10    


 


MPV  9.3 fL (8.7-11.7)   05/09/17  12:10    


 


Neut % (Auto)  88.7 % (39.3-74.2)  H  05/09/17  12:10    


 


Lymph % (Auto)  7.3 % (15.0-45.0)  L  05/09/17  12:10    


 


Mono % (Auto)  3.8 % (4.5-13.0)  L  05/09/17  12:10    


 


Eos % (Auto)  0.0 % (0.6-7.6)  L  05/09/17  12:10    


 


Baso % (Auto)  0.0 % (0.3-1.7)  L  05/09/17  12:10    


 


Nucleat RBC Rel Count  0.0 % (0.0-0.2)   05/09/17  12:10    


 


Absolute Neuts (auto)  7.44 10^3/uL (1.70-6.50)  H  05/09/17  12:10    


 


Absolute Lymphs (auto)  0.61 10^3/uL (1.00-3.00)  L  05/09/17  12:10    


 


Absolute Monos (auto)  0.32 10^3/uL (0.30-0.80)   05/09/17  12:10    


 


Absolute Eos (auto)  0.00 10^3/uL (0.03-0.40)  L  05/09/17  12:10    


 


Absolute Basos (auto)  0.00 10^3/uL (0.02-0.10)  L  05/09/17  12:10    


 


Absolute Nucleated RBC  0.00 10^3/uL (0-0.01)   05/09/17  12:10    


 


Immature Gran %  0.2 % (0.0-1.1)   05/09/17  12:10    


 


Immature Gran #  0.02 10^3/uL (0.00-0.10)   05/09/17  12:10    


 


Sodium  140 mEq/L (134-144)   05/09/17  12:10    


 


Potassium  3.9 mEq/L (3.5-5.2)   05/09/17  12:10    


 


Chloride  103 mEq/L ()   05/09/17  12:10    


 


Carbon Dioxide  22 mEq/l (22-31)   05/09/17  12:10    


 


Anion Gap  15 mEq/L (8-16)   05/09/17  12:10    


 


BUN  26 mg/dL (7-23)  H  05/09/17  12:10    


 


Creatinine  0.9 mg/dL (0.7-1.3)   05/09/17  12:10    


 


Estimated GFR  > 60   05/09/17  12:10    


 


Glucose  136 mg/dL ()  H  05/09/17  12:10    


 


Calcium  10.2 mg/dL (8.5-10.4)   05/09/17  12:10    


 


Total Bilirubin  1.1 mg/dL (0.1-1.4)   05/09/17  12:10    


 


Conjugated Bilirubin  0.5 mg/dL (0.0-0.5)   05/09/17  12:10    


 


Unconjugated Bilirubin  0.6 mg/dL (0.0-1.1)   05/09/17  12:10    


 


AST  28 IU/L (17-59)   05/09/17  12:10    


 


ALT  38 IU/L (21-72)   05/09/17  12:10    


 


Alkaline Phosphatase  73 IU/L ()   05/09/17  12:10    


 


Troponin I  < 0.012 ng/mL (0-0.034)   05/09/17  12:10    


 


Total Protein  8.2 g/dL (6.3-8.2)   05/09/17  12:10    


 


Albumin  4.8 g/dL (3.5-5.0)   05/09/17  12:10    


 


Lipase  84.0 IU/L ()   05/09/17  12:45    








Visualized and Interpreted imaging results: Yes


Interpretation: abdominal x-ray demonstrating no evidence of kidney stones





Assessment & Plan


Assessment: 





 61-year-old male presents with acute nausea and vomiting rendering him unable 

to tolerate oral intake, resulting in dehydration








Plan: 





1. Dehydration.  Evidenced by elevated BUN of 26, secondary to poor oral intake 

and resultant hypovolemia


- warrants ongoing IV fluids


- supportive care of symptoms





2. Nausea/vomiting.  Acute on chronic, new problem this provider, further 

workup indicated.  Potential etiologies include obstructive nephrolithiasis 

versus reflux and gastritis versus other intra-abdominal source or 

gastroparesis.


- reviewed outside records including 3/25/17 discharge summary by Dr. Ronald Hinton characterizing patient's most recent hospitalization for gastroesophageal 

reflux versus peptic ulcer disease, treated with PPI twice daily and see in 

consultation by Dr. Bennett


- is unclear whether the patient's are truly secondary to esophageal gastric 

inflammation and I believe that an upper endoscopy should be performed to 

either confirm or deny at this hypothesis given that it is currently the focus 

of the patient's care and he and his wife are expressing serious concerns as to 

whether the care plan should be adjusted


- given that the patient has had uric acid/ calcium oxalate stones for the past 

several years and his most recent CT of his abdomen did demonstrate bilateral 

nephrolithiasis with stones measuring up to 1.5 cm and horseshoe kidney, which 

could lead to obstruction if these stones are particle eyes Ng and becoming 

lodged in his ureters, would recommend CT stone protocol at this time as well 

as urinalysis to rule out this possibility


- liver panel and lipase unremarkable, more advanced imaging of the abdomen I 

recommended at this time


- if the above workup is all negative, gastric emptying study or GI follow-

through may be appropriate to evaluate for gastroparesis


- supportive care with Zofran, Phenergan, Reglan, Ativan


- patient has not respond well to Carafate past, will not reinitiate


- continue home PPI, at scheduled IV H2 blocker as well as needed times





Diet.  NPO with sips and chips, IV fluids





Prophylaxis.  Low risk patient, SCDs





Code.  Full





Disposition.  Anticipated discharge is 5/10/2017, pending further workup as 

outlined above.





I have discussed patient's presentation with Karen Bowens, hospitalist 

provider, she has signed the patient to me for evaluation.

## 2017-05-09 NOTE — EDPHY
HPI/HX/ROS/PE/MDM


Narrative: 


CHIEF COMPLAINT: Nausea





HPI: This patient is a 61 year old male referred to the Emergency Department by 

Dr. Bagley, his PCP, with an acute exacerbation of chronic nausea presenting at 

1800 last night following a light dinner and worsening over time. He was 

admitted on 3/24/2017 for complaints of epigastric pain and nausea without a 

definitive explanatory diagnosis at that time. He has made some recent dietary 

changes without improvement to his symptoms. Today, he describes his nausea as 

similar to his typical presentation. He is unable to keep food or fluids down. 

He took Zofran last night and Phenergan today, neither of which improved his 

symptoms. He denies headache or abdominal pain. He has no additional complaints.





REVIEW OF SYSTEMS:


Aside from elements discussed in the HPI, a comprehensive 10-point review of 

systems was reviewed and is negative.





PMH: Prior medical records from admission on 3/24/2017 reviewed by myself. 

Medical history includes: esophagitis, chronic epigastric pain with nausea and 

vomiting, knee surgeries.





SOCIAL HISTORY: Wife at bedside





PHYSICAL EXAM:


General:Patient is drowsy, in no acute distress.


ENT:Eyes are normal to inspection.  ENT inspection normal.


Neck: Normal inspection.  Full range of motion.


Respiratory:No respiratory distress.  Breath sounds normal bilaterally.


Cardiovascular: Regular rate and rhythm.  Strong peripheral pulses.  Normal cap 

refill.


Abdomen:The abdomen is nontender to palpation. There are no peritoneal signs. 

There are normal bowel sounds.


Back: Normal to inspection.  No tenderness to palpation.


Skin: Normal color.  No rash.  Warm and dry.


Extremities: Normal appearance.  Full range of motion.


Neuro: Oriented x3.  Normal motor function.  Normal sensory function.





ED Course: 


This 61 year old male presents complaining of an acute exacerbation of chronic 

nausea for which he has most recently been seen in the ED in March and admitted 

at that time. I discussed his case with his PCP, Dr. Bagley, prior to his 

arrival. He presents without any additional complaints. He has no abdominal 

pain and abdomen in nontender on exam. He is mildly drowsy secondary to PO 

Phenergan taken at home this morning. Will proceed with abdomen x-ray, head CT, 

and labs.





IV established. 10mg IV Reglan and 1L IV NS administered.





Labs reviewed and are unremarkable.





1318: Imaging results reported to me by the radiologist and are negative.





1350: On reevaluation, the patient continues to complain of persistent nausea. 

Given this, my recommendation is for admission. He is agreeable to this. 





1402: Dr. Bin Badillo, hospitalist, accepts admission.


MDM: 





This patient presents with persistent nausea and vomiting of unknown etiology.  

I see no signs of bowel obstruction, CVA, pancreatitis, hepatitis or ACS. 





- Data Points


Imaging Results: 


 Imaging Impressions





Abdomen X-Ray  05/09/17 12:44


Impression: No acute findings in the abdomen.








Head CT  05/09/17 12:46


Impression: There is no acute abnormality identified on this unenhanced CT 

evaluation.


 


If there is further clinical concern regarding the patient's symptoms, MR 

imaging is suggested, if not otherwise contraindicated.


 


Findings and recommendations were discussed with Kash Collins MD  at 1:18 

PM, on 5/9/2017.


 











Laboratory Results: 


 Laboratory Results





 05/09/17 12:10 





 05/09/17 12:10 





 











  05/09/17 05/09/17 05/09/17





  12:45 12:10 12:10


 


WBC      8.39 10^3/uL 10^3/uL





     (3.80-9.50) 


 


RBC      5.42 10^6/uL 10^6/uL





     (4.40-6.38) 


 


Hgb      16.8 g/dL g/dL





     (13.7-17.5) 


 


Hct      49.2 % %





     (40.0-51.0) 


 


MCV      90.8 fL fL





     (81.5-99.8) 


 


MCH      31.0 pg pg





     (27.9-34.1) 


 


MCHC      34.1 g/dL g/dL





     (32.4-36.7) 


 


RDW      12.8 % %





     (11.5-15.2) 


 


Plt Count      274 10^3/uL 10^3/uL





     (150-400) 


 


MPV      9.3 fL fL





     (8.7-11.7) 


 


Neut % (Auto)      88.7 % H %





     (39.3-74.2) 


 


Lymph % (Auto)      7.3 % L %





     (15.0-45.0) 


 


Mono % (Auto)      3.8 % L %





     (4.5-13.0) 


 


Eos % (Auto)      0.0 % L %





     (0.6-7.6) 


 


Baso % (Auto)      0.0 % L %





     (0.3-1.7) 


 


Nucleat RBC Rel Count      0.0 % %





     (0.0-0.2) 


 


Absolute Neuts (auto)      7.44 10^3/uL H 10^3/uL





     (1.70-6.50) 


 


Absolute Lymphs (auto)      0.61 10^3/uL L 10^3/uL





     (1.00-3.00) 


 


Absolute Monos (auto)      0.32 10^3/uL 10^3/uL





     (0.30-0.80) 


 


Absolute Eos (auto)      0.00 10^3/uL L 10^3/uL





     (0.03-0.40) 


 


Absolute Basos (auto)      0.00 10^3/uL L 10^3/uL





     (0.02-0.10) 


 


Absolute Nucleated RBC      0.00 10^3/uL 10^3/uL





     (0-0.01) 


 


Immature Gran %      0.2 % %





     (0.0-1.1) 


 


Immature Gran #      0.02 10^3/uL 10^3/uL





     (0.00-0.10) 


 


Sodium    140 mEq/L mEq/L  





    (134-144)  


 


Potassium    3.9 mEq/L mEq/L  





    (3.5-5.2)  


 


Chloride    103 mEq/L mEq/L  





    ()  


 


Carbon Dioxide    22 mEq/l mEq/l  





    (22-31)  


 


Anion Gap    15 mEq/L mEq/L  





    (8-16)  


 


BUN    26 mg/dL H mg/dL  





    (7-23)  


 


Creatinine    0.9 mg/dL mg/dL  





    (0.7-1.3)  


 


Estimated GFR    > 60   





    


 


Glucose    136 mg/dL H mg/dL  





    ()  


 


Calcium    10.2 mg/dL mg/dL  





    (8.5-10.4)  


 


Total Bilirubin    1.1 mg/dL mg/dL  





    (0.1-1.4)  


 


Conjugated Bilirubin    0.5 mg/dL mg/dL  





    (0.0-0.5)  


 


Unconjugated Bilirubin    0.6 mg/dL mg/dL  





    (0.0-1.1)  


 


AST    28 IU/L IU/L  





    (17-59)  


 


ALT    38 IU/L IU/L  





    (21-72)  


 


Alkaline Phosphatase    73 IU/L IU/L  





    ()  


 


Troponin I    < 0.012 ng/mL ng/mL  





    (0-0.034)  


 


Total Protein    8.2 g/dL g/dL  





    (6.3-8.2)  


 


Albumin    4.8 g/dL g/dL  





    (3.5-5.0)  


 


Lipase  84.0 IU/L IU/L    





   ()   











Medications Given: 


 








Discontinued Medications





Sodium Chloride (Ns)  1,000 mls @ 0 mls/hr IV ONCE ONE


   PRN Reason: Wide Open


   Stop: 05/09/17 12:13


   Last Admin: 05/09/17 12:26 Dose:  1,000 mls


Sodium Chloride (Ns)  1,000 mls @ 0 mls/hr IV ONCE ONE


   PRN Reason: Wide Open


   Stop: 05/09/17 12:17


   Last Admin: 05/09/17 12:56 Dose:  1,000 mls


Metoclopramide HCl (Reglan Injection)  10 mg IVP EDNOW ONE


   Stop: 05/09/17 12:13


   Last Admin: 05/09/17 12:26 Dose:  10 mg








General


Time Seen by Provider: 05/09/17 11:54


Initial Vital Signs: 


 Initial Vital Signs











Temperature (C)  36.8 C   05/09/17 11:54


 


Heart Rate  65   05/09/17 11:54


 


Respiratory Rate  18   05/09/17 11:54


 


Blood Pressure  178/89 H  05/09/17 11:54


 


O2 Sat (%)  98   05/09/17 11:54








 











O2 Delivery Mode               Room Air














Allergies/Adverse Reactions: 


 





No Known Allergies Allergy (Verified 05/09/17 11:54)


 








Home Medications: 














 Medication  Instructions  Recorded


 


Esomeprazole Mag Trihydrate 40 mg PO BID 05/09/17





[Nexium]  


 


Ondansetron Odt [Zofran Odt 4 mg 4 mg PO Q4HRS PRN 05/09/17





(*)]  


 


Promethazine HCl [Phenergan 25mg 25 mg PO Q6HRS PRN 05/09/17





(*)]  














Departure





- Departure


Disposition: [facility]


Clinical Impression: 


 Nausea





Condition: Fair


Report Scribed for: Kash Collins


Report Scribed by: Janice Moraes


Date of Report: 05/09/17


Time of Report: 11:56


Physician Review and Approval Statement: Portions of this note were transcribed 

by an ED scribe.  I personally performed the history, physical exam, and 

medical decision making; and confirm the accuracy of the information in the 

transcribed note.

## 2017-05-09 NOTE — GCON
[f 
rep st]



                                                                    CONSULTATION





CHIEF COMPLAINT:  A 61-year-old male with recurrent nausea and vomiting.



HISTORY OF PRESENT ILLNESS:  This 61-year-old gentleman admitted to the 
hospital with acute nausea, vomiting, retching.  He has had acute several 
episodes in the past.  His symptoms started about a year ago.  He would have 
episodes of feeling nauseous for about 2-4 hours.  Symptoms would go away, but 
he would have significant problems with saliva formation and drooling 
associated nausea.  He was having 5-6 episodes over a 6-month period.  Symptoms 
did become progressively worse.  He was admitted this last December, and then 
again in March.  He has had 3 episodes during this year.  He has had an upper 
endoscopy in December that was essentially unremarkable.  He did have some 
erosive esophagitis consistent with patient's history of retching and vomiting.
  Esophageal biopsies were unremarkable.  He does not have any retrosternal 
burning or discomfort.  Denies any regurgitation at night.  He also denies any 
dysphagia.  He has been treated with proton-pump inhibitors in the past with 
questionable benefit.  He has most recently been on Nexium 40 mg twice daily.  
He has also been on Carafate, which seemed to make his symptoms worse.  He does 
have a prior history of migraines.  As a teenager, he would have migraines with 
aura.  He has had no change in bowel habits.  No diarrhea.  No constipation.  
Denies any black tarry stools or blood in the stool.  Other workup has included 
a CT scan that showed no acute abdominal findings. On CT Scan he had the 
finding of multiple nonobstructing bilateral nephrolithiasis.  He also has a 
partially fused horseshoe kidney.  He had an upper GI series in October that 
did show evidence of gastroesophageal reflux, with a small hiatal hernia.  The 
patient has had significant nausea and retching that required antiemetics and 
Ativan.  He does not drink a significant amount of alcohol.  He also does not 
smoke marijuana or use illicit drugs.  He has no particular triggers.  However, 
his wife reports that he may have episodes more after some type of physical 
activity such as hiking or exercise.



PAST MEDICAL HISTORY:  Remarkable for kidney stones.  He had a knee surgery, 
and prior history of migraines.



MEDICATIONS:  Prior to admission:  Nexium 40 mg p.o. twice daily.



ALLERGIES:  He has no known drug allergies.



FAMILY HISTORY:  Negative, as it pertains to chief complaint.



SOCIAL HISTORY:  He is a nonsmoker, nondrinker.



REVIEW OF SYSTEMS:  Negative 10 systems, other than mentioned HPI.



PHYSICAL EXAM:  VITAL SIGNS:  Blood pressure 167/82, heart rate of 62, 
respiratory rate 18, 98% saturation, temperature 36.7.  GENERAL APPEARANCE:  A 
sleepy gentleman in no acute distress.  HEENT:  Normocephalic, atraumatic.  
EOMI.  NECK:  Supple.  No cervical adenopathy.  MOUTH:  Mucous membranes are 
moist.  LUNGS:  Clear.  CARDIAC:  Normal S1, S2, without murmur.  ABDOMEN:  
Nontender.  Normal bowel sounds.  No hepatosplenomegaly.  EXTREMITIES:  Without 
clubbing, cyanosis, edema.  NEUROLOGIC:  Nonfocal.  SKIN:  Warm and dry, 
intact.  PSYCHIATRIC:  The patient is sleepy.



LABORATORY DATA:  Hemoglobin 16.8, hematocrit of 49.2.  Serum chemistries:  
Sodium 140, potassium 3.9, chloride of 103, CO2 22, BUN of 26, blood sugar 136.
  Liver function tests normal.  AST of 28, ALT of 38, alkaline phosphatase of 
73.



IMPRESSION:  A 61-year-old gentleman with recurrent episodes of nausea, vomiting
, dry heaves.  The patient has had upper endoscopy in the past, which was 
unrevealing.  This was done within the last 5 months.  Differential diagnoses 
includes cyclic vomiting syndrome, atypical reflux, possible biliary tract 
disease, cholelithiasis, biliary dyskinesia, less likely gastroparesis, also 
less likely to be eosinophilic gastroenteritis.



RECOMMENDATIONS:  

1.  We will proceed with diagnostic endoscopy in the morning for antral and 
gastric biopsies to rule out eosinophilic gastritis.  Also, small bowel 
biopsies to evaluate for possible celiac disease.

2.  Supportive care with IV hydration, IV pantoprazole 40 mg twice daily.  
Consider gastric emptying study.  Also consider pH impedance study for further 
evaluation of possible atypical reflux, and right upper quadrant ultrasound to 
evaluate for cholelithiasis, and also consider HIDA scan with ejection fraction 
to evaluate for biliary dyskinesia. If the above is unremarkable would 
recommend continued use of PPI and the addition of TCA, Amitriptyline 25 mg 
qhs. 



We will follow with you.





Job #:  [id number]

MTDD

## 2017-05-10 LAB
% IMMATURE GRANULYOCYTES: 0.4 % (ref 0–1.1)
ABSOLUTE IMMATURE GRANULOCYTES: 0.04 10^3/UL (ref 0–0.1)
ABSOLUTE NRBC COUNT: 0 10^3/UL (ref 0–0.01)
ADD DIFF?: NO
ADD MORPH?: NO
ADD SCAN?: NO
ALBUMIN SERPL-MCNC: 3.4 G/DL (ref 3.5–5)
ALP SERPL-CCNC: 39 IU/L (ref 38–126)
ALT SERPL-CCNC: 31 IU/L (ref 21–72)
ANION GAP SERPL CALC-SCNC: 10 MEQ/L (ref 8–16)
AST SERPL-CCNC: 27 IU/L (ref 17–59)
ATYPICAL LYMPHOCYTE FLAG: 10 (ref 0–99)
BILIRUB SERPL-MCNC: 1.2 MG/DL (ref 0.1–1.4)
CALCIUM SERPL-MCNC: 9 MG/DL (ref 8.5–10.4)
CHLORIDE SERPL-SCNC: 110 MEQ/L (ref 97–110)
CO2 SERPL-SCNC: 22 MEQ/L (ref 22–31)
CREAT SERPL-MCNC: 1.1 MG/DL (ref 0.7–1.3)
ERYTHROCYTE [DISTWIDTH] IN BLOOD BY AUTOMATED COUNT: 13.2 % (ref 11.5–15.2)
FRAGMENT RBC FLAG: 0 (ref 0–99)
GFR SERPL CREATININE-BSD FRML MDRD: > 60 ML/MIN/{1.73_M2}
GLUCOSE SERPL-MCNC: 86 MG/DL (ref 70–100)
HCT VFR BLD CALC: 42.9 % (ref 40–51)
HGB BLD-MCNC: 14.3 G/DL (ref 13.7–17.5)
LEFT SHIFT FLG: 10 (ref 0–99)
LIPEMIA HEMOLYSIS FLAG: 80 (ref 0–99)
MCH RBC BLDCO QN: 31.2 PG (ref 27.9–34.1)
MCHC RBC AUTO-ENTMCNC: 33.3 G/DL (ref 32.4–36.7)
MCV RBC AUTO: 93.7 FL (ref 81.5–99.8)
NRBC-AUTO%: 0 % (ref 0–0.2)
PLATELET # BLD: 207 10^3/UL (ref 150–400)
PLATELET CLUMPS FLAG: 20 (ref 0–99)
PMV BLD AUTO: 10.7 FL (ref 8.7–11.7)
POTASSIUM SERPL-SCNC: 4.8 MEQ/L (ref 3.5–5.2)
PROT SERPL-MCNC: 6 G/DL (ref 6.3–8.2)
RBC # BLD AUTO: 4.58 10^6/UL (ref 4.4–6.38)
SODIUM SERPL-SCNC: 142 MEQ/L (ref 134–144)

## 2017-05-10 PROCEDURE — 0DB38ZX EXCISION OF LOWER ESOPHAGUS, VIA NATURAL OR ARTIFICIAL OPENING ENDOSCOPIC, DIAGNOSTIC: ICD-10-PCS | Performed by: INTERNAL MEDICINE

## 2017-05-10 PROCEDURE — 0DB68ZX EXCISION OF STOMACH, VIA NATURAL OR ARTIFICIAL OPENING ENDOSCOPIC, DIAGNOSTIC: ICD-10-PCS | Performed by: INTERNAL MEDICINE

## 2017-05-10 PROCEDURE — 0DB98ZX EXCISION OF DUODENUM, VIA NATURAL OR ARTIFICIAL OPENING ENDOSCOPIC, DIAGNOSTIC: ICD-10-PCS | Performed by: INTERNAL MEDICINE

## 2017-05-10 RX ADMIN — PANTOPRAZOLE SODIUM SCH MG: 40 TABLET, DELAYED RELEASE ORAL at 21:09

## 2017-05-10 RX ADMIN — PANTOPRAZOLE SODIUM SCH MG: 40 TABLET, DELAYED RELEASE ORAL at 12:48

## 2017-05-10 RX ADMIN — ONDANSETRON PRN MG: 2 SOLUTION INTRAMUSCULAR; INTRAVENOUS at 11:30

## 2017-05-10 RX ADMIN — ONDANSETRON PRN MG: 2 SOLUTION INTRAMUSCULAR; INTRAVENOUS at 07:34

## 2017-05-10 RX ADMIN — SODIUM CHLORIDE AND POTASSIUM CHLORIDE SCH MLS: 9; 1.49 INJECTION, SOLUTION INTRAVENOUS at 21:08

## 2017-05-10 RX ADMIN — SODIUM CHLORIDE AND POTASSIUM CHLORIDE SCH MLS: 9; 1.49 INJECTION, SOLUTION INTRAVENOUS at 15:10

## 2017-05-10 RX ADMIN — Medication SCH MLS: at 07:24

## 2017-05-10 RX ADMIN — Medication SCH MLS: at 21:08

## 2017-05-10 NOTE — HOSPPROG
Hospitalist Progress Note


Assessment/Plan: 





*  Progressive severe debilitating nausea


   -EGD unremarkable, GB okay on US, CT with chronic kidney stone


   -gastric emptying study in am


   -consider small bowel follow through


   -check MRI brain with contrast


   -consider stress test r/o anginal equivalent


   -continue PPI, IV anti-emetics, prn IV ativan


   -biopsies pending r/o celiac, eosinophilic gastritis


   -may be cyclic vomiting syndrome - consider amitriptyline 25mg qhs











Subjective: Severe nausea, not even attempting PO


Objective: 


 Vital Signs











Temp Pulse Resp BP Pulse Ox


 


 36.7 C   76   12   162/92 H  97 


 


 05/10/17 15:23  05/10/17 15:23  05/10/17 15:23  05/10/17 15:23  05/10/17 15:23








 











 05/09/17 05/10/17 05/11/17





 05:59 05:59 05:59


 


Intake Total   100


 


Balance   100








this is my first visit with Curt





- Physical Exam


Constitutional: appears nourished, not in pain, uncomfortable (lying in bed, 

not moving, not talking, looks afraid to move)


Cardiovascular: regular rate and rhythym, no murmur, rub, or gallop


Respiratory: no respiratory distress, no rales or rhonchi, clear to auscultation


Gastrointestinal: normoactive bowel sounds, soft, non-tender abdomen, no 

palpable masses


Skin: no rashes or abrasions, no fluctuance, no induration


Neurologic: AAOx3, sensation intact bilaterally


Psychiatric: interacting appropriately, not anxious, not encephalopathic, 

thought process linear





ICD10 Worksheet


Patient Problems: 


 Problems











Problem Status Onset


 


Nausea Acute  


 


Abdominal pain Acute

## 2017-05-10 NOTE — GPN
[f rep st]



                                                                 PROCEDURE NOTE





PROCEDURE:  Esophagogastroduodenoscopy with biopsy.



PREOPERATIVE DIAGNOSIS:  Nausea and vomiting.



POSTOPERATIVE DIAGNOSES:  

1.  Normal-appearing small bowel status post small bowel biopsies, rule out celiac disease.

2.  Normal gastric, antrum, body, and normal stomach status post biopsy of the antrum and body for h
istology, rule out eosinophilic gastritis.

3.  Small hiatal hernia.

4.  Normal esophagus status post proximal distal esophageal biopsies, rule out eosinophilic esophagi
tis.



INDICATIONS:  A 61-year-old gentleman who has had about a year and a half history of recurrent episo
patito of nausea and vomiting.  Patient had previous endoscopy that was essentially unremarkable other 
than mild reflux changes.  Patient has not been responsive to proton pump inhibitors or Carafate.  H
bhavik has had recurrent bouts of nausea and vomiting with increased elevation.  He has normal periods in
 between attacks and asymptomatic.  He presents now for upper endoscopy.



PHYSICAL EXAMINATION:  VITAL SIGNS:  Stable.  LUNGS:  Clear.  CARDIAC:  Normal S1, S2 without murmur
.



PERMIT:  Procedure was explained to the patient.  Risks and benefits of the procedure outlined to 
e patient.  Informed consent was obtained.



PREOPERATIVE MEDICATIONS:  Fentanyl 150 mcg, Versed 6 mg.



FINDINGS AND PROCEDURE:  Patient was placed in the left lateral decubitus position.  GIF-180 video s
cope was passed in the oropharynx under direct visualization to the proximal esophagus.  Esophagus w
as normal.  GE junction about 41 cm.  There was a small to moderate sized hiatal hernia.  Endoscope 
was passed into the stomach through the pylorus and the 1st and 2nd portions of the duodenum.  Duode
nal sweep was normal.  Biopsy was obtained in the 2nd portion of the duodenum to rule out celiac dis
ease.  Endoscope was brought back in the stomach.  Retroflex through the stomach revealed a normal a
ngularis, fundus, and cardia with hiatal hernia in the cardia.  Endoscope was then retroflexed.  Bio
psies were obtained from antrum and body for histology, rule out eosinophilic gastritis.  Endoscope 
was brought back and retroflexed in the stomach, brought back in the distal esophagus.  Esophageal m
ucosa appeared normal.  Random biopsies were obtained from the distal and proximal esophagus to rule
 out eosinophilic esophagitis.  Endoscope was then withdrawn.



IMPRESSION:  Essentially normal upper endoscopy with the finding of a small to moderate sized hiatal
 hernia.  Status post biopsy of the small bowel.  Rule out celiac disease.  Gastric body and antrum:
  Rule out eosinophilic gastritis and esophageal biopsies of the proximal and distal esophagus to ru
le out eosinophilic esophagitis.



RECOMMENDATIONS:  Advance diet as tolerated.  Proceed with right upper quadrant ultrasound to rule o
ut cholelithiasis and gastric emptying study to evaluate for possible underlying gastroparesis.





Job #:  [id number]

## 2017-05-11 LAB
% IMMATURE GRANULYOCYTES: 0.2 % (ref 0–1.1)
ABSOLUTE IMMATURE GRANULOCYTES: 0.02 10^3/UL (ref 0–0.1)
ABSOLUTE NRBC COUNT: 0 10^3/UL (ref 0–0.01)
ADD DIFF?: NO
ADD MORPH?: NO
ADD SCAN?: NO
ALBUMIN SERPL-MCNC: 3.9 G/DL (ref 3.5–5)
ALP SERPL-CCNC: 61 IU/L (ref 38–126)
ALT SERPL-CCNC: 34 IU/L (ref 21–72)
ANION GAP SERPL CALC-SCNC: 9 MEQ/L (ref 8–16)
AST SERPL-CCNC: 23 IU/L (ref 17–59)
ATYPICAL LYMPHOCYTE FLAG: 10 (ref 0–99)
BILIRUB SERPL-MCNC: 1.4 MG/DL (ref 0.1–1.4)
BILIRUBIN-CONJUGATED: 0.5 MG/DL (ref 0–0.5)
BILIRUBIN-UNCONJUGATED: 0.9 MG/DL (ref 0–1.1)
CALCIUM SERPL-MCNC: 9.2 MG/DL (ref 8.5–10.4)
CHLORIDE SERPL-SCNC: 106 MEQ/L (ref 97–110)
CO2 SERPL-SCNC: 22 MEQ/L (ref 22–31)
CREAT SERPL-MCNC: 1 MG/DL (ref 0.7–1.3)
ERYTHROCYTE [DISTWIDTH] IN BLOOD BY AUTOMATED COUNT: 12.5 % (ref 11.5–15.2)
FRAGMENT RBC FLAG: 0 (ref 0–99)
GFR SERPL CREATININE-BSD FRML MDRD: > 60 ML/MIN/{1.73_M2}
GLUCOSE SERPL-MCNC: 94 MG/DL (ref 70–100)
HCT VFR BLD CALC: 47.2 % (ref 40–51)
HGB BLD-MCNC: 16.3 G/DL (ref 13.7–17.5)
LEFT SHIFT FLG: 0 (ref 0–99)
LIPEMIA HEMOLYSIS FLAG: 90 (ref 0–99)
MCH RBC BLDCO QN: 30.6 PG (ref 27.9–34.1)
MCHC RBC AUTO-ENTMCNC: 34.5 G/DL (ref 32.4–36.7)
MCV RBC AUTO: 88.7 FL (ref 81.5–99.8)
NRBC-AUTO%: 0 % (ref 0–0.2)
PLATELET # BLD: 245 10^3/UL (ref 150–400)
PLATELET CLUMPS FLAG: 0 (ref 0–99)
PMV BLD AUTO: 9.2 FL (ref 8.7–11.7)
POTASSIUM SERPL-SCNC: 4.2 MEQ/L (ref 3.5–5.2)
PROT SERPL-MCNC: 6.6 G/DL (ref 6.3–8.2)
RBC # BLD AUTO: 5.32 10^6/UL (ref 4.4–6.38)
SODIUM SERPL-SCNC: 137 MEQ/L (ref 134–144)
TROPONIN I SERPL-MCNC: < 0.012 NG/ML (ref 0–0.03)

## 2017-05-11 RX ADMIN — METOPROLOL TARTRATE SCH MG: 25 TABLET, FILM COATED ORAL at 13:12

## 2017-05-11 RX ADMIN — SODIUM CHLORIDE AND POTASSIUM CHLORIDE SCH MLS: 9; 1.49 INJECTION, SOLUTION INTRAVENOUS at 21:12

## 2017-05-11 RX ADMIN — ASPIRIN SCH MG: 81 TABLET, DELAYED RELEASE ORAL at 16:18

## 2017-05-11 RX ADMIN — PANTOPRAZOLE SODIUM SCH MG: 40 TABLET, DELAYED RELEASE ORAL at 08:48

## 2017-05-11 RX ADMIN — Medication SCH MLS: at 08:46

## 2017-05-11 RX ADMIN — PANTOPRAZOLE SODIUM SCH MG: 40 TABLET, DELAYED RELEASE ORAL at 21:13

## 2017-05-11 RX ADMIN — SODIUM CHLORIDE AND POTASSIUM CHLORIDE SCH MLS: 9; 1.49 INJECTION, SOLUTION INTRAVENOUS at 05:09

## 2017-05-11 RX ADMIN — METOPROLOL TARTRATE SCH MG: 25 TABLET, FILM COATED ORAL at 21:13

## 2017-05-11 NOTE — ECHO
[id number]

[id number]



+---------------------------------------------------+      [address]

:                                                   :       [place] [address]

:                                                   :           [phone]

+---------------------------------------------------+       Fax [phone]



                       Adult Echocardiographic Report



+----------------------------------------------------------------------------

---------+

:Name: KISHAN MALLORY Robyndb Date: 2017 02:25 PM                    

         :

:MRN: [id number]          Hospital Admission Number: [id number] Modesta morales: 382:

:: 1955          Gender: Male                           Height: 76 i

n        :

:Age: 61 yrs              Race: WH                               Weight: 215 

lb       :

:Reason For Study: New onset Afib with RVR                                   

         :

:                                                                BSA: 2.3 met

ers2     :

+----------------------------------------------------------------------------

---------+

MMode/2D Measurements \T\ Calculations

IVSd: 0.70 cm       LVIDd: 5.1 cm      FS: 38.7 %        Ao root diam:

LVPWd: 1.2 cm       LVIDs: 3.1 cm      EDV(Teich):       4.0 cm

                                       123.4 ml          LA dimension:

                                       ESV(Teich):       3.7 cm

                                       38.5 ml

                                       EF(Teich): 68.8 %

        _____________________________________________________________



LVLd ap4: 9.4 cm    SV(MOD-sp4):

EDV(MOD-sp4):       86.0 ml

123.0 ml

LVLs ap4: 8.3 cm

ESV(MOD-sp4):

37.0 ml

EF(MOD-sp4): 69.9 %



Normal Measurement Values:

+----------------------------------------------------------------------------

----------------------------------+

:LVIDd (3.5-5.7cm)    IVSd (0.6-1.1cm)     LVPWd (0.6-1.1cm)     Aortic Root 

(2.0-3.7cm)Left Atrium (1.5-4.0cm):

:LV Vol(d) (76-115ml) LV Vol(s) (29-48ml)  Ejec Fraction (50-65%)PV Jean Carlos (0.6-

1.2m/s)    TV Jean Carlos (0.4-1.0m/s)    :

:MV E Jean Carlos (0.8-1.0m/s)MV A Jean Carlos (0.3-1.0m/s)LVOT Jean Carlos (0.7-1.2m/s) Asc Ao Jean Carlos (

0.9-1.8m/s)                       :

+----------------------------------------------------------------------------

----------------------------------+

Doppler Measurements \T\ Calculations

MV E max jean carlos: 42.4 cm/sec               Ao V2 max: 114.0 cm/sec

MV A max jean carlos: 56.3 cm/sec               Ao max P.2 mmHg

MV E/A: 0.75



Left Ventricle

The left ventricle is normal in size and function. There is normal left

ventricular wall thickness. Left ventricular systolic function is normal.

Ejection Fraction = 65-70%. No regional wall motion abnormalities noted.





Right Ventricle

The right ventricle is normal in size and function.



Atria

The left atrial size is normal. Right atrial size is normal. The interatrial

septum is intact with no evidence for an atrial septal defect.





Mitral Valve

The mitral valve is normal in structure and function. There is no evidence

of mitral valve prolapse. There is no mitral valve stenosis. There is mild

mitral regurgitation.



Tricuspid Valve

Normal tricuspid valve. There is trace to mild tricuspid regurgitation.

Right ventricular systolic pressure is normal.



Aortic Valve

The aortic valve is trileaflet. The aortic valve opens well. There is no

aortic stenosis. Trace aortic regurgitation.



Pulmonic Valve

The pulmonic valve is normal in structure and function. There is no pulmonic

valvular regurgitation.



Great Vessels

The aortic root is normal size.





Pericardium/Pleural

There is no pericardial effusion.



Conclusion

A complete two-dimensional transthoracic echocardiogram was performed (2D,

M-mode, Doppler and color flow Doppler).

The left ventricle is normal in size and function.

Left ventricular systolic function is normal.

Ejection Fraction = 65-70%.

There is mild mitral regurgitation.

There is trace to mild tricuspid regurgitation.

Right ventricular systolic pressure is normal.

Trace aortic regurgitation.



_____________________________________________________________________________







Final Reading Physician:

                        Tye Garcia signed on 2017 03:42 PM

Ordering Physician: Tyler Shearer

Performed By: Janet Lindquist RDCS

## 2017-05-11 NOTE — HOSPPROG
Hospitalist Progress Note


Assessment/Plan: 





*  Severe nausea


   -EGD unremarkable, GB okay on US, CT with chronic kidney stone


   -gastric emptying study in am


   -check MRI brain with contrast - negative


   -continue PPI, IV anti-emetics, prn IV ativan


   -biopsies pending r/o celiac, eosinophilic gastritis


   -may be cyclic vomiting syndrome - consider amitriptyline 25mg qhs





Consider HIDA scan


Consider CT abd/pelvis WITH CONTRAST - always non-contrast studies in past





*  Rapid afib - transient/provoked due to stress test


   -PO metoprolol


   -[id number] low - hold off on anticoag


   -ECHO pending





*  Abnormal stress test


   -d/w Cards - possible cath





*  GERD - continue PPI











Subjective: Nausea a little better today


Objective: 


 Vital Signs











Temp Pulse Resp BP Pulse Ox


 


 36.4 C   51 L  18   157/95 H  94 


 


 05/11/17 13:43  05/11/17 13:43  05/11/17 13:43  05/11/17 13:43  05/11/17 13:43








 Laboratory Results





 05/11/17 04:48 





 05/11/17 04:48 





 











 05/10/17 05/11/17 05/12/17





 05:59 05:59 05:59


 


Intake Total  100 


 


Balance  100 








Case d[facility] - rapid afib with syncope during stress 

test, abnormal study - consider cath





Abd Xray - negative


MRI Brain with contrast - negative





- Physical Exam


Constitutional: no apparent distress, appears nourished, not in pain


Cardiovascular: regular rate and rhythym, no murmur, rub, or gallop


Respiratory: no respiratory distress, no rales or rhonchi, clear to auscultation


Gastrointestinal: normoactive bowel sounds, soft, non-tender abdomen, no 

palpable masses


Skin: no rashes or abrasions, no fluctuance, no induration


Neurologic: AAOx3, sensation intact bilaterally


Psychiatric: interacting appropriately, not anxious, not encephalopathic, 

thought process linear





ICD10 Worksheet


Patient Problems: 


 Problems











Problem Status Onset


 


Nausea Acute  


 


Abdominal pain Acute

## 2017-05-11 NOTE — CPR
[f 
rep st]



                                                  NONINVASIVE CARDIAC PROCEDURE 
REPORT





PROCEDURE PERFORMED:  Injection of Alejandra myocardial perfusion imaging, stress 
testing.



SUPERVISING CARDIOLOGIST:  Wil Douglas MD



INDICATION FOR STRESS TEST:  Alejandra MPI, history of lumbar spine fracture, 
ongoing nausea, questioning potential angina equivalent.



PRE:  After obtaining informed consent and ensuring patient's n.p.o. status of 
caffeine for 12 hours, patient was placed on initial EKG.  Initial 
electrocardiogram shows sinus rhythm, flattened T-waves in V2, in aVL.  The 
patient denies any chest pain, shortness of breath, or symptoms suggesting of 
ischemia.  Initial blood pressure 120/60, saturating 95%.



INJECTION:  Patient was given Lexiscan, slow IV push, followed by nuclear 
isotope.  Within 1 minute, patient reported lightheadedness.  He was noted to 
have a narrow-complex tachycardia, initially in brief beats of 4-5, that soon 
went into a supraventricular tachycardia with rates up to 170 BPM.  Patient did 
become very lightheaded.  He was sitting in a chair.  He did actually have a 
brief syncopal event for less than 5 seconds.  He was laid flat and regained 
consciousness.  No loss of pulse.  Blood pressure did decrease down to the low 
90s initially with the SVT.  Once it slowed down, it could be determined that 
it was AFib with RVR at a rate of 157 BPM.  



Due to decrease of lower blood pressures, the patient was given 500 normal 
saline IV bolus and for rate control he was given IV metoprolol 5 mg IV push x2 
with 5 minutes between intervals.  Within 10 minutes of the 2nd dose of 
metoprolol, patient converted back to sinus rhythm.  He reported during the 
whole episode of no chest pressure or shortness of breath, but once conversion 
did report a brief left anterior chest pressure that dissipated within a few 
minutes.  Denied any nausea or vomiting.  



His electrocardiogram with conversion was sinus bradycardic, normal axis, with 
no other significant changes.  Within 15 minutes of his conversion, his vital 
signs remained stable.  



His hospitalist was notified.  We were asked for official consultation.  He was 
taken back up to his room.  Stress MPI imaging pending.  Order laboratory 
studies.  I have asked for echocardiogram to be done as soon as his arrival 
back to his room.  He will be placed on continuous cardiac monitoring once he 
is back on the unit.  Vital signs are stable at the time the patient leaving 
nuclear medicine.  Patient's vital signs are stable. 





Job #:  [id number]

MTDD

## 2017-05-11 NOTE — GCON
[f 
rep st]



                                                                    CONSULTATION





CARDIOLOGY CONSULTATION.



INDICATION FOR CONSULTATION:  Atrial fibrillation with RVR, status post 
Lexiscan injection.



HISTORY OF PRESENT ILLNESS:  The patient is a 61-year-old male, who was 
admitted to [facility] on May 9, 2017 for a complaint of ongoing 
nausea episodes, with associated vomiting.  Reporting ongoing episodes for 
greater than 6 weeks, coming on spontaneously, and persisting throughout the 
day.  He does report some improvement on some days, but none real significant.  
He does report he had a previous emergency department visit, which they thought 
potentially was GERD, he had been placed on PPI twice daily, which he reported 
initially helping, but reporting symptoms had worsened.  He reports 2 days 
prior to admission he has significant increased episodes of nausea that were 
significantly worse, and was brought into the hospital by his wife for further 
evaluation.  Since his hospital admission he has been admitted by hospitalist 
services, he has been on 3 [place], in which his vital signs have been remaining 
stable.  He reports ongoing problems, continuing with nausea.  He has been seen 
by GI, reporting mild improvement.  There was a question that potentially his 
nausea could be his angina equivalent.  He was scheduled to undergo MPI study, 
which I performed this morning.  His initial electrocardiogram showed sinus 
rhythm, normal axis, with flattened P-waves in anterior lateral leads.  Patient 
reporting no symptoms suggesting of ischemia.  He was given Lexiscan, and 
within 1-2 minutes he developed atrial fibrillation with RVR with recorded rate 
up to 175 BPM.  He was sitting in a chair at the time of atrial fibrillation 
and reported lightheadedness, with a brief episode of syncope less than 5 
seconds.  He was laid flat, noted to be regain consciousness immediately.  He 
continued to have atrial fibrillation with RVR at a rate of 150 BPM, and his 
blood pressure was noted to be mildly lower with systolics in the 90s.  He was 
given 500 mL of normal saline bolus, which improved the pressure, and then he 
was given 5 mg of metoprolol q.5 minutes x2 doses, in which he converted back 
to sinus rhythm, sinus bradycardia.  His vital signs were stable, and he has 
been sent back to the floor.  He reports no significant cardiac history in his 
past, he denies with his nausea episodes of any chest pain or pressure, 
reporting no shortness of breath.  Reports he exercises daily with no symptoms.
  He reports he has had no recent fevers, chills or night sweats.  Denies of 
any palpitations, orthopnea, PND, edema, near-syncope, or syncopal events in 
the past.  Cardiac risk factors are age, sex, questionable hyperlipidemia, and 
also a previous smoker.



PAST MEDICAL HISTORY:  GERD, ongoing nausea and vomiting with episodes greater 
than 6 weeks, kidney stones, hiatal hernia, old lumbar spine fracture, and a 
history of migraines.



PAST SURGICAL HISTORY:  Includes knee surgery, colonoscopy within the last 2 
years.



FAMILY HISTORY:  He reports no significant family history of coronary artery 
disease, or history of sudden cardiac death, reporting his father lived to the 
age of 99 of "old age", reporting mother  from complications of lupus at 58.



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 
in the industry for 40 years.  He reports he is .  He has two adult 
children who are both alive and well.  He denies any illicit drug use, does 
report that he drinks 2 cups of wine on a daily basis.



ALLERGIES:  No known drug allergies.



MEDICATIONS:  At home include Phenergan 25 mg p.o. q.6 hours p.r.n., Zofran 4 
mg p.o. q.4 hours p.r.n., Nexium 40 mg p.o. b.i.d.



REVIEW OF SYSTEMS:  A 10-point review of systems is done on this patient, all 
negative except as mentioned above.



PHYSICAL EXAMINATION:  GENERAL:  A tall, thin, well-groomed,  male.  
He is alert and oriented to person, place, time, situation, currently appears 
to be under no acute distress.  VITAL SIGNS:  Current vital signs are 113/75, 
he is sinus bradycardia on the monitor, with rate at 40 beats per minute, 
saturating 95% on room air.  HEENT:  Head is normocephalic.  Lips and tongue 
are pink and moist with no signs of cyanosis, conjunctivae pink.  NECK:  
Trachea is midline, +2 carotid pulses bilateral.  No auscultated bruits, no 
jugular vein distention.  LUNGS:  Clear to auscultation, no rhonchi, rales or 
wheezes.  No accessory muscle use, no intercostal muscle retraction noted.  
CARDIAC:  Normal S1 and S2, no S3, S4, gallops, rubs or murmur noted.  ABDOMEN:
  Soft, nontender, bowel sounds x4 quadrants, no organomegaly, no palpable 
masses.  SKIN:  Pink, warm, dry, no cyanosis, no clubbing, no peripheral edema.
  VASCULAR:  +2 carotids bilateral, +2 radials bilateral, +1 dorsal pedal and 
posterior tibial pulses bilateral.  NEURO:  Cranial nerves 2-12 grossly intact.



LABORATORY STUDIES:  Laboratory studies done today show a WBC of 9.28, 
hemoglobin of 16.3, hematocrit of 47.2, platelet count of 245, sodium 137, 
potassium 4.2, chloride 106, CO2 of 22, BUN 19, creatinine 1.0, glucose 94, 
calcium 9.2, total bilirubin 1.4, AST 23, ALT 34, alkaline phosphate 61, 
troponin less than 0.012.  Total protein 6.6.  Albumin 3.9.  Patient had a UA 
done on May 9, 2017 that was positive for leukocytes, ketones and blood, RBCs 
in glucose.



STUDIES:  Lexiscan injection as mentioned above, MPI study currently pending.  



Abdominal x-ray done on May 9, 2017 showing no acute findings.  Head CT done on 
May 9, 2017, no acute abnormality noted.  



Abdominal pelvis CT done on May 9, 2017 showing no hydronephrosis or utero 
calcanea, partially fused horseshoe kidney, numerous large bilateral renal cysts
, and bilateral __________ are unchanged, no acute intraabdominal process.  No 
free fluid, no sigmoid diverticulosis, unchanged.  



EEG done by Dr. Bernard on May 10, 2017 showing normal upper endoscope with 
findings of a small to moderate size hiatal hernia.  



Brain MRI on May 10, 2017 showing no source for ongoing nausea.



ASSESSMENT AND PLAN:  

1.  Atrial fibrillation with rapid ventricular response, patient has returned 
back to sinus rhythm, first episode.  He denies of any chest pressure or pain, 
reports no history of palpitations, did not have any nausea with episode.  He 
has a CHADS-VASc score of 0 to 1, with him returning back to sinus rhythm with 
IV beta-blockers.  I would like him to start low-dose metoprolol at 12.5 mg 
p.o. b.i.d.  We will place him on continuous cardiac monitoring, he has not had 
a TSH drawn, which I have ordered.  Rest of tests show no anemia on recent 
laboratory studies, and no significant electrolyte abnormalities.  His MPI 
study is currently pending.  With sudden onset of atrial fibrillation with 
rapid ventricular response, I would like him to get an echocardiogram done to 
evaluate structural heart, which I have ordered to get this afternoon.  
Depending on results of his testing, if all seems normal I do not think I would 
continue him on beta-blockers as outpatient, and consider doing a 30-day 
monitor on him in an outpatient setting to evaluate for possible atrial 
fibrillation burden.  As for anticoagulation, patient has a fairly low risk 
CHADS-VASc score of 0 to 1, currently for which anticoagulations are not 
warranted.  We will hold off on starting him on aspirin at this time until his 
further GI workup has been completed, but would consider, if approved by GI, of 
starting him on 81 mg of aspirin daily.  

2.  Near-syncope or syncopal event.  Patient noted to have syncopal event 
briefly once atrial fibrillation with rapid ventricular response started, this 
was witnessed, and regained consciousness once he was supine.   He was noted 
initially with AFib low systolic blood pressure 90s,   He has been given a 500 
mL fluid bolus,     His current blood pressure is within normal limits in sinus 
rhythm.. More likely syncopal event was due to decreased cardiac output with 
atrial fibrillation with rapid ventricular response,  in combination with 
orthostatics ( patient was sitting in chair at the time event), and once 
patient was supine, syncope resolved.  I would like later this afternoon for 
the nursing staff to do orthostatic blood pressures on him for evaluation.

3.  Nausea, questioning possible angina equivalent:  Patient has had two 
negative troponin in the hospital, MPI study currently pending.  We will make 
further recommendations upon completion.  

4.  Episodic nausea:  Patient will continue testing per GI and hospital 
services.  

Thank you for this consultation.  We will be glad to follow along with you.



Addendum 2017 [id number]



Laboratory results back showing TSH 1.270.  MPI study showing low-normal EF of 
51%, focal infarct suspected involving the inferior wall of the LV without 
evidence of ischemia.  Echo showing mild MR, trace to mild TR, RVSP within 
normal limits, trace AI.  After discussing results with Dr. Douglas, with 
abnormal stress testing, and MPI study showing low-normal EF, recent bout of AF
, and patient continuation with nausea, worsening with exertion, questioning if 
this is potentially his angina equivalent.  Felt best course of action for 
patient to undergo diagnostic coronary angiogram, we will schedule this for 
tomorrow.  Risks and benefits of the procedure explained to both patient and 
wife, they agreed, and wanting to proceed.  He will be made NPO after midnight.
  I have spoken to GI, Dr. Salazar, and update him to our plan.  He is in 
agreement that patient may be started on aspirin therapy, and has found no 
reason for him, if necessary to get anticoagulation and anti-platelet therapy.  
Results were also discussed with Dr. Mendiola. 





Job #:  [id number]

MTDD

## 2017-05-11 NOTE — SOAPPROG
SOAP Progress Note


Assessment/Plan: 


Assessment:





Episodic nausea and vomiting.  Work up negative.  EGD normal (biopsies pending)

, Normal RUQ U/S and Normal CT Scan of the abdomen, normal MRI of the brain. 








Plan:





1. 2 way abdominal x-ray today (c/o abdominal bloating)





2. GES tomorrow





3. Clear liquid diet





05/11/17 08:57





Subjective: 





CC: Nausea and vomiting








Somewhat better today. C/o abdominal bloating and increased salivation. No 

complaint of abdominal pain. 


Objective: 





 Vital Signs











Temp Pulse Resp BP Pulse Ox


 


 36.5 C   63   16   166/99 H  98 


 


 05/11/17 07:53  05/11/17 07:53  05/11/17 07:53  05/11/17 07:53  05/11/17 07:53








 Laboratory Results





 05/11/17 04:48 





 05/11/17 04:48 





 











 05/10/17 05/11/17 05/12/17





 05:59 05:59 05:59


 


Intake Total  100 


 


Balance  100 














 











Generic Name Dose Route Start Last Admin





  Trade Name Freq  PRN Reason Stop Dose Admin


 


Acetaminophen  650 mg  05/09/17 15:23  





  Tylenol  PO  11/05/17 15:22  





  Q4HRS PRN   





  Pain, Mild/Fever, Can Take PO   


 


Calcium Carbonate  500 mg  05/09/17 15:26  





  Tums  PO  11/05/17 15:25  





  TID PRN   





  Indigestion   


 


Famotidine/Sodium Chloride  50 mls @ 200 mls/hr  05/09/17 15:26  05/11/17 08:46





  Pepcid 20 Mg (Premix)  IV  11/05/17 15:25  50 mls





  Q12HRS ANDRZEJ   Administration


 


Potassium Chloride/Sodium Chloride  1,000 mls @ 150 mls/hr  05/09/17 15:30  05/ 11/17 05:09





  Ns W/ 20 Kcl/L  IV  11/05/17 15:29  1,000 mls





  CONT ANDRZEJ   Administration


 


Lorazepam  0.5 - 1 mg  05/10/17 17:45  05/10/17 19:19





  Ativan Injection  IVP  11/05/17 15:22  1 mg





  Q4 PRN   Administration





  Anxiety, Unable to Take PO   


 


Metoclopramide HCl  10 mg  05/09/17 15:23  05/10/17 07:23





  Reglan Injection  IVP  11/05/17 15:22  10 mg





  Q6HRS PRN   Administration





  GI Motility, unable to take PO   


 


Metoclopramide HCl  10 mg  05/09/17 15:23  





  Reglan  PO  11/05/17 15:22  





  Q6HRS PRN   





  GI Motility if taking PO   


 


Ondansetron HCl  4 - 8 mg  05/09/17 15:23  05/10/17 11:30





  Zofran  IVP  11/05/17 15:22  8 mg





  Q4HRS PRN   Administration





  Nausea/Vomiting, Can't Take PO   


 


Ondansetron HCl  4 - 8 mg  05/09/17 15:23  





  Zofran Odt  PO  11/05/17 15:22  





  Q4HRS PRN   





  Nausea/Vomiting, Use 1st   


 


Pantoprazole Sodium  40 mg  05/09/17 21:00  05/11/17 08:48





  Protonix  PO  11/05/17 20:59  40 mg





  BID ANDRZEJ   Administration


 


Promethazine HCl  12.5 - 25 mg  05/09/17 15:23  05/10/17 12:57





  Phenergan  PO  11/05/17 15:22  25 mg





  Q6HRS PRN   Administration





  Nausea/Vomiting, Use 2nd   














Discontinued Medications














Generic Name Dose Route Start Last Admin





  Trade Name Freq  PRN Reason Stop Dose Admin


 


Fentanyl  Confirm  05/10/17 08:45  





  Sublimaze  Administered  05/10/17 08:46  





  Dose   





  200 mcg   





  .ROUTE   





  .STK-MED ONE   


 


Gadobutrol  Confirm  05/10/17 19:50  





  Gadavist 1 Mmol/Ml  Administered  05/10/17 19:51  





  Dose   





  10 ml   





  IVP   





  .STK-MED ONE   


 


Sodium Chloride  1,000 mls @ 0 mls/hr  05/09/17 12:12  05/09/17 12:26





  Ns  IV  05/09/17 12:13  1,000 mls





  ONCE ONE   Administration





  Wide Open   


 


Sodium Chloride  1,000 mls @ 0 mls/hr  05/09/17 12:16  05/09/17 12:56





  Ns  IV  05/09/17 12:17  1,000 mls





  ONCE ONE   Administration





  Wide Open   


 


Lorazepam  0.5 - 1 mg  05/09/17 15:23  05/10/17 13:38





  Ativan Injection  IVP  11/05/17 15:22  1 mg





  Q8HRS PRN   Administration





  Anxiety, Unable to Take PO   


 


Metoclopramide HCl  10 mg  05/09/17 12:12  05/09/17 12:26





  Reglan Injection  IVP  05/09/17 12:13  10 mg





  EDNOW ONE   Administration


 


Midazolam HCl  Confirm  05/10/17 08:45  





  Versed  Administered  05/10/17 08:46  





  Dose   





  4 mg   





  .ROUTE   





  .STK-MED ONE   














Physical Exam





- Physical Exam


General Appearance: alert, no apparent distress


Respiratory: lungs clear, normal breath sounds


Abdomen: normal bowel sounds, non-tender, soft, other (slightly distended)


Neuro/Psych: no motor/sensory deficits, alert, normal mood/affect





ICD10 Worksheet


Patient Problems: 


 Problems











Problem Status Onset


 


Nausea Acute  


 


Abdominal pain Acute

## 2017-05-12 LAB
% IMMATURE GRANULYOCYTES: 0.2 % (ref 0–1.1)
ABSOLUTE IMMATURE GRANULOCYTES: 0.02 10^3/UL (ref 0–0.1)
ABSOLUTE NRBC COUNT: 0 10^3/UL (ref 0–0.01)
ADD DIFF?: NO
ADD MORPH?: NO
ADD SCAN?: NO
ANION GAP SERPL CALC-SCNC: 10 MEQ/L (ref 8–16)
APTT BLD: 28.2 SEC (ref 23–38)
ATYPICAL LYMPHOCYTE FLAG: 10 (ref 0–99)
CALCIUM SERPL-MCNC: 9.2 MG/DL (ref 8.5–10.4)
CHLORIDE SERPL-SCNC: 105 MEQ/L (ref 97–110)
CHOLEST SERPL-MCNC: 172 MG/DL (ref 140–220)
CHOLEST/HDLC SERPL: 3.25 RATIO (ref 1–4.97)
CO2 SERPL-SCNC: 21 MEQ/L (ref 22–31)
CREAT SERPL-MCNC: 1.1 MG/DL (ref 0.7–1.3)
ERYTHROCYTE [DISTWIDTH] IN BLOOD BY AUTOMATED COUNT: 12.3 % (ref 11.5–15.2)
FRAGMENT RBC FLAG: 0 (ref 0–99)
GFR SERPL CREATININE-BSD FRML MDRD: > 60 ML/MIN/{1.73_M2}
GLUCOSE SERPL-MCNC: 100 MG/DL (ref 70–100)
HCT VFR BLD CALC: 46.4 % (ref 40–51)
HGB BLD-MCNC: 15.9 G/DL (ref 13.7–17.5)
HIGH DENSITY LIPOPROTEIN: 53 MG/DL (ref 40–65)
INR PPP: 1.25 (ref 0.83–1.16)
LDLC/HDLC SERPL: 2.09 RATIO (ref 1–3.64)
LEFT SHIFT FLG: 0 (ref 0–99)
LIPEMIA HEMOLYSIS FLAG: 90 (ref 0–99)
LOW DENSITY LIPOPROTEIN: 111 MG/DL (ref 80–100)
MAGNESIUM SERPL-MCNC: 1.9 MG/DL (ref 1.6–2.3)
MCH RBC BLDCO QN: 30.6 PG (ref 27.9–34.1)
MCHC RBC AUTO-ENTMCNC: 34.3 G/DL (ref 32.4–36.7)
MCV RBC AUTO: 89.4 FL (ref 81.5–99.8)
NON-HIGH DENSITY LIPOPROTEIN: 119 MG/DL (ref 90–129)
NRBC-AUTO%: 0 % (ref 0–0.2)
PLATELET # BLD: 255 10^3/UL (ref 150–400)
PLATELET CLUMPS FLAG: 0 (ref 0–99)
PMV BLD AUTO: 9.5 FL (ref 8.7–11.7)
POTASSIUM SERPL-SCNC: 4.4 MEQ/L (ref 3.5–5.2)
PROTHROMBIN TIME: 15.7 SEC (ref 12–15)
RBC # BLD AUTO: 5.19 10^6/UL (ref 4.4–6.38)
SODIUM SERPL-SCNC: 136 MEQ/L (ref 134–144)
TRIGL SERPL-MCNC: 44 MG/DL (ref 40–150)
VERY LOW DENSITY LIPOPROTEINS: 8 MG/DL (ref 8–25)

## 2017-05-12 PROCEDURE — 027136Z DILATION OF CORONARY ARTERY, TWO ARTERIES WITH THREE DRUG-ELUTING INTRALUMINAL DEVICES, PERCUTANEOUS APPROACH: ICD-10-PCS | Performed by: INTERNAL MEDICINE

## 2017-05-12 PROCEDURE — 4A023N7 MEASUREMENT OF CARDIAC SAMPLING AND PRESSURE, LEFT HEART, PERCUTANEOUS APPROACH: ICD-10-PCS | Performed by: INTERNAL MEDICINE

## 2017-05-12 PROCEDURE — B2111ZZ FLUOROSCOPY OF MULTIPLE CORONARY ARTERIES USING LOW OSMOLAR CONTRAST: ICD-10-PCS | Performed by: INTERNAL MEDICINE

## 2017-05-12 PROCEDURE — B2151ZZ FLUOROSCOPY OF LEFT HEART USING LOW OSMOLAR CONTRAST: ICD-10-PCS | Performed by: INTERNAL MEDICINE

## 2017-05-12 RX ADMIN — PANTOPRAZOLE SODIUM SCH MG: 40 TABLET, DELAYED RELEASE ORAL at 09:32

## 2017-05-12 RX ADMIN — METOPROLOL TARTRATE SCH MG: 25 TABLET, FILM COATED ORAL at 09:31

## 2017-05-12 RX ADMIN — ASPIRIN SCH: 81 TABLET, DELAYED RELEASE ORAL at 09:23

## 2017-05-12 NOTE — CPEKG
Heart Rate: 66

RR Interval: 909

P-R Interval: 164

QRSD Interval: 98

QT Interval: 456

QTC Interval: 478

P Axis: 70

QRS Axis: 59

T Wave Axis: 70

EKG Severity - BORDERLINE ECG -

EKG Impression: SINUS RHYTHM

EKG Impression: BORDERLINE PROLONGED QT INTERVAL

Electronically Signed By: Tegan Shrestha 15-May-2017 07:03:28

## 2017-05-12 NOTE — SOAPPROG
SOAP Progress Note


Assessment/Plan: 


Assessment:





Still with nausea. GI work up has been negative.  Patient now getting a cardiac 

work up.  ECHO c/w possible old MI. Had A. Fib on TMST.  Getting a cath today. 








Plan:





1. Cardiac cath today





2. Await cardiac work up. Will hold off any additional GI work up until after 

cardiology evaluation





05/12/17 13:53





Subjective: 





CC: Nausea and vomiting





Still with nausea,  maybe a little better this AM.  Awaiting cardiac cath. 


Objective: 





 Vital Signs











Temp Pulse Resp BP Pulse Ox


 


 36.8 C   66   16   101/68   93 


 


 05/12/17 11:19  05/12/17 11:19  05/12/17 11:19  05/12/17 11:19  05/12/17 11:19








 Laboratory Results





 05/12/17 04:39 





 05/12/17 04:39 





 











 05/11/17 05/12/17 05/13/17





 05:59 05:59 05:59


 


Intake Total 100 300 


 


Balance 100 300 








 











PT  15.7 SEC (12.0-15.0)  H  05/12/17  04:39    


 


INR  1.25  (0.83-1.16)  H  05/12/17  04:39    














 











Generic Name Dose Route Start Last Admin





  Trade Name Freq  PRN Reason Stop Dose Admin


 


Acetaminophen  650 mg  05/09/17 15:23  





  Tylenol  PO  11/05/17 15:22  





  Q4HRS PRN   





  Pain, Mild/Fever, Can Take PO   


 


Aspirin Buffered  81 mg  05/11/17 16:00  05/12/17 09:23





  Aspirin Ec  PO  11/07/17 15:59  Not Given





  DAILY ANDRZEJ   


 


Calcium Carbonate  500 mg  05/09/17 15:26  





  Tums  PO  11/05/17 15:25  





  TID PRN   





  Indigestion   


 


Potassium Chloride/Sodium Chloride  1,000 mls @ 150 mls/hr  05/09/17 15:30  05/ 11/17 21:12





  Ns W/ 20 Kcl/L  IV  11/05/17 15:29  1,000 mls





  CONT ANDRZEJ   Administration


 


Sodium Chloride  1,000 mls @ 30 mls/hr  05/12/17 06:00  05/12/17 12:30





  Ns  IV  05/13/17 15:19  Not Given





  ONCALL ONE   


 


Lorazepam  0.5 - 1 mg  05/10/17 17:45  05/12/17 03:14





  Ativan Injection  IVP  11/05/17 15:22  1 mg





  Q4 PRN   Administration





  Anxiety, Unable to Take PO   


 


Metoclopramide HCl  10 mg  05/09/17 15:23  05/10/17 07:23





  Reglan Injection  IVP  11/05/17 15:22  10 mg





  Q6HRS PRN   Administration





  GI Motility, unable to take PO   


 


Metoclopramide HCl  10 mg  05/09/17 15:23  





  Reglan  PO  11/05/17 15:22  





  Q6HRS PRN   





  GI Motility if taking PO   


 


Metoprolol Tartrate  12.5 mg  05/11/17 12:45  05/12/17 09:31





  Lopressor  PO  11/07/17 12:44  12.5 mg





  BID ANDRZEJ   Administration


 


Nitroglycerin  0.4 mg  05/11/17 15:45  





  Nitrostat  SL  11/07/17 15:44  





  PRN PRN   





  Chest Pain   


 


Ondansetron HCl  4 - 8 mg  05/09/17 15:23  05/10/17 11:30





  Zofran  IVP  11/05/17 15:22  8 mg





  Q4HRS PRN   Administration





  Nausea/Vomiting, Can't Take PO   


 


Ondansetron HCl  4 - 8 mg  05/09/17 15:23  





  Zofran Odt  PO  11/05/17 15:22  





  Q4HRS PRN   





  Nausea/Vomiting, Use 1st   


 


Pantoprazole Sodium  40 mg  05/09/17 21:00  05/12/17 09:32





  Protonix  PO  11/05/17 20:59  40 mg





  BID ANDRZEJ   Administration


 


Promethazine HCl  12.5 - 25 mg  05/09/17 15:23  05/10/17 12:57





  Phenergan  PO  11/05/17 15:22  25 mg





  Q6HRS PRN   Administration





  Nausea/Vomiting, Use 2nd   














Discontinued Medications














Generic Name Dose Route Start Last Admin





  Trade Name Freq  PRN Reason Stop Dose Admin


 


Aspirin Buffered  325 mg  05/12/17 06:00  05/12/17 11:39





  Aspirin Ec  PO  05/12/17 06:01  325 mg





  ONCALL ONE   Administration


 


Diazepam  5 mg  05/12/17 11:35  05/12/17 11:39





  Valium  PO  05/12/17 11:36  5 mg





  ONCALL ONE   Administration


 


Diphenhydramine HCl  25 mg  05/12/17 06:00  05/12/17 11:39





  Benadryl  PO  05/12/17 06:01  25 mg





  ONCALL ONE   Administration


 


Etomidate  0 - 20 mg  05/11/17 12:34  05/11/17 13:07





  Etomidate  IVP  05/11/17 12:35  Not Given





  ONCALL ONE   


 


Fentanyl  Confirm  05/10/17 08:45  





  Sublimaze  Administered  05/10/17 08:46  





  Dose   





  200 mcg   





  .ROUTE   





  .STK-MED ONE   


 


Fentanyl  0 - 100 mcg  05/11/17 12:34  05/11/17 13:07





  Sublimaze  IVP  05/11/17 12:35  Not Given





  ONCALL ONE   


 


Gadobutrol  Confirm  05/10/17 19:50  





  Gadavist 1 Mmol/Ml  Administered  05/10/17 19:51  





  Dose   





  10 ml   





  IVP   





  .STK-MED ONE   


 


Sodium Chloride  1,000 mls @ 0 mls/hr  05/09/17 12:12  05/09/17 12:26





  Ns  IV  05/09/17 12:13  1,000 mls





  ONCE ONE   Administration





  Wide Open   


 


Sodium Chloride  1,000 mls @ 0 mls/hr  05/09/17 12:16  05/09/17 12:56





  Ns  IV  05/09/17 12:17  1,000 mls





  ONCE ONE   Administration





  Wide Open   


 


Famotidine/Sodium Chloride  50 mls @ 200 mls/hr  05/09/17 15:26  05/11/17 08:46





  Pepcid 20 Mg (Premix)  IV  11/05/17 15:25  50 mls





  Q12HRS ANDRZEJ   Administration


 


Sodium Chloride  500 mls @ 0 mls/hr  05/11/17 12:34  05/11/17 13:09





  Ns  IV  05/11/17 12:35  Not Given





  ONCALL ONE   





  TKO   


 


Lorazepam  0.5 - 1 mg  05/09/17 15:23  05/10/17 13:38





  Ativan Injection  IVP  11/05/17 15:22  1 mg





  Q8HRS PRN   Administration





  Anxiety, Unable to Take PO   


 


Metoclopramide HCl  10 mg  05/09/17 12:12  05/09/17 12:26





  Reglan Injection  IVP  05/09/17 12:13  10 mg





  EDNOW ONE   Administration


 


Midazolam HCl  Confirm  05/10/17 08:45  





  Versed  Administered  05/10/17 08:46  





  Dose   





  4 mg   





  .ROUTE   





  .STK-MED ONE   


 


Midazolam HCl  0 - 6 mg  05/11/17 12:34  05/11/17 13:08





  Versed  IVP  05/11/17 12:35  Not Given





  ONCALL ONE   


 


Propofol  0 - 200 mg  05/11/17 12:34  05/11/17 13:31





  Diprivan  IVP  05/11/17 12:35  Not Given





  ONCALL ONE   


 


Regadenoson  Confirm  05/11/17 11:41  





  Lexiscan  Administered  05/11/17 11:42  





  Dose   





  0.4 mg   





  IVP   





  .STK-MED ONE   














Physical Exam





- Physical Exam


General Appearance: alert, no apparent distress


Respiratory: lungs clear, normal breath sounds


Cardiac/Chest: regular rate, rhythm


Abdomen: normal bowel sounds, non-tender, soft


Skin: normal color, warm/dry


Neuro/Psych: no motor/sensory deficits, alert, normal mood/affect





ICD10 Worksheet


Patient Problems: 


 Problems











Problem Status Onset


 


Nausea Acute  


 


Abdominal pain Acute

## 2017-05-12 NOTE — PDDXCAT
Diagnostic Cath Note





- .


Date: 05/12/17


Intervention: 


1. PTCA and drug eluting stent (RHONA) implantation proximal right coronary 

artery 2. PTCA and RHONA of the mid LAD x 2





*Procedure





1. selective coronary angiography


2. left heart catheterization


3. left ventriculogram


4. PTCA and drug eluting stent implantation of the proximal right coronary 

artery


5. PTCA and drug eluting stent implantation of the proximal left anterior 

descending





Indication: High-risk stress test, chronic nausea related to exertion which may 

be an anginal equivalent of CCS III angina.





Access: Right radial artery (a plethysmography trace-assisted Brandon's test was 

used to document dual artery supply to the hand and index finger prior to access

).





*Materials





Left Heart Cath size: 6F


Left Heart Cath materials: JR4, JL3.5, SAL1SH, RBL4, pigtail, Intuition Guide 

Wire, 4 x 28 mm Synergy x 2, 3 x 28 mm Synergy, 3 x 20 Emerge, 5.5 x 12 mm NC 

Emerge.





*Findings-Selective Coronary Angiography





LM:  The left main is ~8 mm in size and bifurcates into an LAD and circumflex 

system. There is no LM disease.





LAD: The proximal LAD is ~3.5 mm in size. There is an 80% mid LAD lesion at the 

level of the septal takeoff with KHANG III flow.





LCX:  The proximal left circumflex is ~3 mm in size. There are luminal 

irregularities consistent with atherosclerosis. Maximal luminal stenosis is 20-

30% in the mid LCx.





RCA: The right coronary is ~5 mm in size and dominant. There is a 70% proximal 

RCA lesion with KHANG III flow. The lesion is eccentric 





*Findings-Left Heart Catheterization





LVEDP: 13 mmHg


AO: 163/88/123 mmHg





LVEF: 65%


LVG: There is mild basal inferior wall hypokinesis. The visualized portion of 

the thoracic aorta is within normal limits of size without evidence aneurysm or 

marissa dissection.





*Intervention





A 6 Qatari SAL1SH was used for guide catheter support. A 0.014" Intuition Guide 

Wire was inserted into the distal right coronary artery under direct 

fluoroscopic and angiographic guidance. Heparin bolus was given with activated 

clotting time was documented to be >250 seconds. A 4 x 28 mm Synergy Drug 

Eluting Stent was placed in the proximal RCA lesion and deployed under 14 jade 

of pressure. Status post stent implantation there was 0% residual stenosis with 

KHANG III flow.





We then turned our attention to an 80% mid LAD lesion at the level of the 

septal takeoff with KHANG III flow. The guidewire was exchanged with a 6 Qatari 

RBL4 and the Intuition Guide Wire was directed to the distal LAD. A 3 x 20 mm 

Emerge balloon was used to pre-dilate the proximal LAD lesion under 12 jade of 

pressure. We proceeded with stent implantation with a 4 x 28 mm Synergy drug 

eluting stent deployed under 14 jade of pressure. The stent was post-dilated 

with a 5.5 x 12 mm NC emerge balloon under 10 jade of pressure.





Status post stent implantation, there was still an 80% residual stenosis just 

distal to the newly placed stent. A 3 x 28 mm Synergy drug eluting stent was 

placed in this lesion and inflated under a maximum of 16 jade of pressure.





Post intervention there was 0% residual stenosis in both the proximal RCA and 

mid LAD with KHANG III flow to both vessels.  





*Summary





Complications: None


Estimated blood loss: <50ml


Closure method: TR Band





Assessment/Conclusion: 





1. Severe and flow-limiting native vessel coronary artery disease including a 70

% proximal RCA lesion and 80% mid LAD lesion at the level of the septal 

takeoff. The RCA lesion was repaired with a 4 x 28 mm Synergy drug eluting stent

, while the LAD was repaired with 4 x 28 mm and 3 x 28 mm Synergy drug eluting 

stents deployed in the distal vessel. 





There was 0% residual stenosis in both the RCA and LAD lesions with KHANG III 

flow angiographically. The patient will need to be on dual anti-platelet 

therapy for at least 1 year s/p stent implantation. His cholesterol should be 

treated with statin therapy to achieve a non-HDL cholesterol <100 mg/dL.





2. Mild basal inferior wall hypokinesis on left ventriculogram with normal 

ejection fraction estimated to be 65%.








Patient Problems: 


 Problems











Problem Status Onset


 


Nausea Acute  


 


Abdominal pain Acute

## 2017-05-12 NOTE — CPEKG
Heart Rate: 46

RR Interval: 1304

P-R Interval: 146

QRSD Interval: 104

QT Interval: 552

QTC Interval: 483

P Axis: 73

QRS Axis: 75

T Wave Axis: 69

EKG Severity - ABNORMAL ECG -

EKG Impression: SINUS BRADYCARDIA

EKG Impression: NONSPECIFIC REPOLARIZATION ABNORMALITIES

EKG Impression: BORDERLINE PROLONGED QT INTERVAL

EKG Impression: INCOMPLETE RIGHT BUNDLE BRANCH BLOCK

EKG Impression: STTWAVE CHANGE SUGGESTIVE OF ISCHEMIA

Electronically Signed By: Wil Douglas 12-May-2017 16:24:13

## 2017-05-12 NOTE — HOSPPROG
Hospitalist Progress Note


Assessment/Plan: 





*  CAD s/p stent LAD x2, RCA x 1


   -ASA, Effient


   -start statin for LDL goal < 70


*  Transient afib


   -metoprolol


   -consider anti-coagulation


*  Severe nausea - suspect anginal equivalent


   -if nausea resolved s/p stents then clearly cause


*  GERD - PPI














Subjective: Nausea immediately better post cath, had nausea right up until 

procedure.   Had recurrence on nausea in cath lab upon stent deployment.


Objective: 


 Vital Signs











Temp Pulse Resp BP Pulse Ox


 


 36.8 C   66   16   101/68   93 


 


 05/12/17 11:19  05/12/17 11:19  05/12/17 11:19  05/12/17 11:19  05/12/17 11:19








 Laboratory Results





 05/12/17 04:39 





 05/12/17 04:39 





 











 05/11/17 05/12/17 05/13/17





 05:59 05:59 05:59


 


Intake Total 100 300 


 


Balance 100 300 








 











PT  15.7 SEC (12.0-15.0)  H  05/12/17  04:39    


 


INR  1.25  (0.83-1.16)  H  05/12/17  04:39    








EKG viewed, my personal interpretation is - NSR


Cath report reviewed - prox LAD and RCA lesions s/p 3 stents








- Physical Exam


Constitutional: no apparent distress, appears nourished, not in pain


Cardiovascular: regular rate and rhythym, no murmur, rub, or gallop


Respiratory: no respiratory distress, no rales or rhonchi, clear to auscultation


Gastrointestinal: normoactive bowel sounds, soft, non-tender abdomen, no 

palpable masses


Skin: no rashes or abrasions, no fluctuance, no induration


Neurologic: AAOx3, sensation intact bilaterally


Psychiatric: interacting appropriately, not anxious, not encephalopathic, 

thought process linear





ICD10 Worksheet


Patient Problems: 


 Problems











Problem Status Onset


 


Nausea Acute  


 


Abdominal pain Acute

## 2017-05-13 LAB
% IMMATURE GRANULYOCYTES: 0.4 % (ref 0–1.1)
ABSOLUTE IMMATURE GRANULOCYTES: 0.04 10^3/UL (ref 0–0.1)
ABSOLUTE NRBC COUNT: 0 10^3/UL (ref 0–0.01)
ADD DIFF?: NO
ADD MORPH?: NO
ADD SCAN?: NO
ALBUMIN SERPL-MCNC: 3.6 G/DL (ref 3.5–5)
ANION GAP SERPL CALC-SCNC: 9 MEQ/L (ref 8–16)
AST SERPL-CCNC: 17 IU/L (ref 17–59)
ATYPICAL LYMPHOCYTE FLAG: 10 (ref 0–99)
BILIRUB SERPL-MCNC: 1.5 MG/DL (ref 0.1–1.4)
CALCIUM SERPL-MCNC: 9.3 MG/DL (ref 8.5–10.4)
CHLORIDE SERPL-SCNC: 104 MEQ/L (ref 97–110)
CO2 SERPL-SCNC: 19 MEQ/L (ref 22–31)
CREAT SERPL-MCNC: 1 MG/DL (ref 0.7–1.3)
ERYTHROCYTE [DISTWIDTH] IN BLOOD BY AUTOMATED COUNT: 12.3 % (ref 11.5–15.2)
FRAGMENT RBC FLAG: 0 (ref 0–99)
GFR SERPL CREATININE-BSD FRML MDRD: > 60 ML/MIN/{1.73_M2}
GLUCOSE SERPL-MCNC: 118 MG/DL (ref 70–100)
HCT VFR BLD CALC: 45.8 % (ref 40–51)
HGB BLD-MCNC: 16.1 G/DL (ref 13.7–17.5)
LDH SERPL-CCNC: 381 IU/L (ref 313–618)
LEFT SHIFT FLG: 0 (ref 0–99)
LIPEMIA HEMOLYSIS FLAG: 90 (ref 0–99)
MAGNESIUM SERPL-MCNC: 1.8 MG/DL (ref 1.6–2.3)
MCH RBC BLDCO QN: 30.4 PG (ref 27.9–34.1)
MCHC RBC AUTO-ENTMCNC: 35.2 G/DL (ref 32.4–36.7)
MCV RBC AUTO: 86.4 FL (ref 81.5–99.8)
NRBC-AUTO%: 0 % (ref 0–0.2)
PLATELET # BLD: 266 10^3/UL (ref 150–400)
PLATELET CLUMPS FLAG: 0 (ref 0–99)
PMV BLD AUTO: 9 FL (ref 8.7–11.7)
POTASSIUM SERPL-SCNC: 3.9 MEQ/L (ref 3.5–5.2)
RBC # BLD AUTO: 5.3 10^6/UL (ref 4.4–6.38)
SODIUM SERPL-SCNC: 132 MEQ/L (ref 134–144)
TROPONIN I SERPL-MCNC: 0.18 NG/ML (ref 0–0.03)

## 2017-05-13 RX ADMIN — PRASUGREL HYDROCHLORIDE SCH MG: 10 TABLET, FILM COATED ORAL at 09:43

## 2017-05-13 RX ADMIN — METOPROLOL TARTRATE SCH MG: 25 TABLET, FILM COATED ORAL at 00:31

## 2017-05-13 RX ADMIN — ASPIRIN SCH MG: 81 TABLET, DELAYED RELEASE ORAL at 09:43

## 2017-05-13 RX ADMIN — ATORVASTATIN CALCIUM SCH MG: 40 TABLET, FILM COATED ORAL at 09:44

## 2017-05-13 RX ADMIN — HYDROMORPHONE HYDROCHLORIDE PRN MG: 1 INJECTION, SOLUTION INTRAMUSCULAR; INTRAVENOUS; SUBCUTANEOUS at 19:13

## 2017-05-13 RX ADMIN — FAMOTIDINE SCH MG: 20 TABLET, FILM COATED ORAL at 17:14

## 2017-05-13 RX ADMIN — METOPROLOL TARTRATE SCH MG: 25 TABLET, FILM COATED ORAL at 09:43

## 2017-05-13 RX ADMIN — ACETAMINOPHEN PRN MG: 325 TABLET ORAL at 21:00

## 2017-05-13 RX ADMIN — PANTOPRAZOLE SODIUM SCH: 40 TABLET, DELAYED RELEASE ORAL at 01:29

## 2017-05-13 RX ADMIN — ACETAMINOPHEN PRN MG: 325 TABLET ORAL at 06:42

## 2017-05-13 RX ADMIN — PANTOPRAZOLE SODIUM SCH MG: 40 TABLET, DELAYED RELEASE ORAL at 09:44

## 2017-05-13 RX ADMIN — ACETAMINOPHEN PRN MG: 325 TABLET ORAL at 00:30

## 2017-05-13 RX ADMIN — CARVEDILOL SCH MG: 3.12 TABLET, FILM COATED ORAL at 19:53

## 2017-05-13 RX ADMIN — PANTOPRAZOLE SODIUM SCH MG: 40 TABLET, DELAYED RELEASE ORAL at 21:00

## 2017-05-13 RX ADMIN — HYDROMORPHONE HYDROCHLORIDE PRN MG: 1 INJECTION, SOLUTION INTRAMUSCULAR; INTRAVENOUS; SUBCUTANEOUS at 21:54

## 2017-05-13 NOTE — HOSPPROG
Hospitalist Progress Note


Assessment/Plan: 


  Total of 110 critical care minutes were spent on direct patient care with the 

patient and his family, at bedside, during two separate and distinct episodes 

of care as outlined below:





Initial 74 minutes of critical care time spent directly with the patient during 

a stat team which became a code stroke alert:





- patient's PCU nurse initially contacted me to discuss symptoms of severe 

headache after the patient had returned from sitting on the patio; at that time 

he was not experiencing any neurologic symptoms and the focus of treatment was 

for headache in the setting of remote history of migraine with aura


- the patient received Excedrin and had not experience any appreciable relief 

and we had decided to administer as-needed Benadryl, as needed Tylenol, 

reassess symptoms


- prior to the patient receiving these additional medications, the patient 

reported some visual changes and his wife noted that he appeared to have 

dysarthria and confusion and stat team/ stroke alert was called


- I responded to the patient's bedside with the stat team and performed initial 

neurologic assessment which demonstrated full motor strength bilateral upper 

and lower extremities, sensation intact bilaterally, extraocular movements 

intact, cooperative patient but with some evidence of delayed verbal and 

physical responses as well as nonsensical speech, systolic blood pressure was 

155, heart rate was in the 60s


- noncontrast head CT demonstrated no intracranial hemorrhage and was unclear 

whether there was a small area of right-sided subcortical stroke upon review of 

image with Dr. Gomez, but this area was uncertain, it did not correlate with 

his acute symptoms, and it seemed unlikely that such a defect would be present 

within this time frame from an acute event, and that, if it truly does 

demonstrate a defect, it is sub-acute


- Dr. Sullivan from [facility] evaluated the patient via telemedicine 

and I provided him with the additional clinical history and, given that the 

patient's dysarthria appeared to be resolving with only some latent confusion 

being his only symptom, Dr. Sullivan did not believe that this presentation 

represented acute CVA and tPA was not indicated at that time


- Dr. Sullivan and I both agreed that the most likely scenario unfolding was a 

complex migraine preceded by aura and resulting in transient neurologic 

symptoms including confusion and dysarthria, resolving as the patient's 

headache became less severe


- we agreed to transfer the patient to the ICU, provide him with scheduled 

neuro checks, treat his headache with combination of dexamethasone/ Reglan/ 

Dilaudid the patient was also provided with 0.5 mg of IV Ativan given some 

latent situational anxiety


- once the patient was more calm, CT angiogram of the head and the neck was 

performed which did not demonstrate any vascular occlusion and did not 

demonstrate any evidence of CVA


- I was contacted by the patient's ICU nurse for systolic blood pressure in the 

170s, and since the patient does not meet the diagnosis of CVA, permissive 

hypertension is inappropriate in the setting of recent PCI, recommended keeping 

the systolic blood pressure less than 160


- the patient's systolic blood pressure improved to the 120s without any 

antihypertensives but merely with pain management








Additional 36 minutes of critical care time spent directly with the patient and 

his entire family reassessing his clinical status and discussing below:





- the patient's neurologic status has completely resolved to his baseline with 

some latent lethargy after receiving the pain medications


- he is no longer dysarthric, he is no longer confused, he is oriented x3


- his headache is significantly improved after receiving some additional 

Dilaudid and Tylenol


- his family is very concerned that a potential diagnosis of stroke has been 

missed, they wanted to discuss in full detail how the diagnosis of CVA was 

assessed and ultimately dismissed, and I attempted to do so to the best of my 

ability


- the family also was very concerned about this selection of medications to 

control his headache and I reassured them that we would avoid Imitrex given its 

potential for increased cardiac events and that we would discontinue the 

amitriptyline which had been initiated today for possible irritable bowel 

syndrome per their request


- pain relief tonight should be pursued using as needed Tylenol, Dilaudid, 

Reglan, Benadryl


- neurology will be consulting with the patient and the family to continue the 

conversation regarding the suspected diagnosis of complex migraine, as well as 

the high prevalence of transient encephalopathy iatrogenically occurring 

hospitalizations


- our Imaging Department has reported to us that MRI/MRA will not be possible 

in the short term given his recent stent placement


- we did discuss that the patient may have an elevated annual stroke risk if he 

continues to experience atrial fibrillation, which was an isolated and provoked 

event early in the hospitalization


- I did not advise that we initiate systemic anticoagulation at this time given 

that the patient is currently on dual anti-platelet medications, it is not 

entirely clear whether he will have any further episodes of atrial fibrillation

, and it seems less likely that the event above was a TIA


- advised the patient will most likely have some type of ambulatory event 

monitor placed as an outpatient following this hospitalization and he should 

have this followed up through the cardiology clinic and engage in conversations 

regarding this issue with our cardiology consultants throughout the rest of the 

hospitalization


- I attempted to reassure the patient's family that are management of the 

symptoms outlined above as well as the diagnostic workup and ultimate treatment 

has been thorough, and the patient seems very satisfied with the care he has 

received during this hospitalization





The patient remains critically ill with ongoing high risk of morbidity.


Objective: 


 Vital Signs











Temp Pulse Resp BP Pulse Ox


 


 37.1 C   63   16   124/65 H  97 


 


 05/13/17 20:00  05/13/17 23:00  05/13/17 23:00  05/13/17 23:00  05/13/17 23:00








 Laboratory Results





 05/13/17 09:03 





 05/13/17 09:03 





 











 05/12/17 05/13/17 05/14/17





 05:59 05:59 05:59


 


Intake Total 300 2100 


 


Output Total  1200 


 


Balance 300 900 








 











PT  15.7 SEC (12.0-15.0)  H  05/12/17  04:39    


 


INR  1.25  (0.83-1.16)  H  05/12/17  04:39    














ICD10 Worksheet


Patient Problems: 


 Problems











Problem Status Onset


 


Abdominal pain Acute  


 


Nausea Acute

## 2017-05-13 NOTE — HOSPPROG
Hospitalist Progress Note


Assessment/Plan: 





61M with 1.5 year h/o N associated with retching and then relieved with 

vomiting. Has seen GI in past with workup showing gastritis and small hiatal 

hernia. Within the past 6 weeks, he has had worsening tyrx2exeq without getting 

any signficant relief. 1st visit with patient. ECGs personally interpreted. >35 

minutes spent with patient and wife with >50% spent on counseling.





#. Severe nausea


   -EGD unremarkable, GB okay on US, CT with chronic kidney stone


   -gastric emptying study has been deferred in the setting of abnormal MPI


   -MRI brain with contrast negative


   -pt reports that he had relief in ED/we looked up the regimen and he was 

given multiple agents will be replicated now (Famotidine, Maalox, viscous 

Lidocaine, Levsin, and Haldol)


   -in place of Haldol, will try Thorazine as he notes some hiccuping


   -biopsies pending r/o celiac, eosinophilic gastritis


   -may be cyclic vomiting syndrome - consider amitriptyline 25mg qhs


   -await further GI input





# AF - PF/provoked during stress test


   -no further episodes on telemetry


   -[id number] low - hold off on anticoag


   -ECHO unremarkalbe





#.  Abnormal stress test - fixed defect 


   -proceed to [facility] and had obstructive disease in mLAD and proximal RCA s/p PCI


   -started DAPT and Atorvastatin





#.  GERD - continue PPI





#. DVT ppx - encouraged patient to ambulate


Subjective: Feels nauseated and fatigued.


Objective: 


 Vital Signs











Temp Pulse Resp BP Pulse Ox


 


 97.9 F   55 L  14   114/76   94 


 


 05/13/17 11:21  05/13/17 11:21  05/13/17 11:21  05/13/17 11:21  05/13/17 11:21








 Laboratory Results





 05/13/17 09:03 





 05/13/17 09:03 





 











 05/12/17 05/13/17 05/14/17





 05:59 05:59 05:59


 


Intake Total 300 2100 


 


Output Total  1200 


 


Balance 300 900 








 











PT  15.7 SEC (12.0-15.0)  H  05/12/17  04:39    


 


INR  1.25  (0.83-1.16)  H  05/12/17  04:39    














- Time Spent With Patient


Time Spent with Patient: greater than 35 minutes


Time Spent with Patient: Greater than 35 minutes spent on this patients care, 

greater than 50% of time spent counseling, educating, and coordinating care 

regarding the above mentioned plan.





- Physical Exam


Constitutional: appears nourished


Eyes: PERRL, anicteric sclera


Ears, Nose, Mouth, Throat: moist mucous membranes


Cardiovascular: regular rate and rhythym, no murmur, rub, or gallop


Respiratory: no respiratory distress, no rales or rhonchi


Gastrointestinal: normoactive bowel sounds, soft, non-tender abdomen


Neurologic: AAOx3





ICD10 Worksheet


Patient Problems: 


 Problems











Problem Status Onset


 


Nausea Acute  


 


Abdominal pain Acute

## 2017-05-14 LAB
% IMMATURE GRANULYOCYTES: 0.4 % (ref 0–1.1)
ABSOLUTE IMMATURE GRANULOCYTES: 0.04 10^3/UL (ref 0–0.1)
ABSOLUTE NRBC COUNT: 0 10^3/UL (ref 0–0.01)
ADD DIFF?: NO
ADD MORPH?: NO
ADD SCAN?: NO
ALBUMIN SERPL-MCNC: 3.4 G/DL (ref 3.5–5)
ALP SERPL-CCNC: 50 IU/L (ref 38–126)
ALT SERPL-CCNC: 26 IU/L (ref 21–72)
ANION GAP SERPL CALC-SCNC: 9 MEQ/L (ref 8–16)
AST SERPL-CCNC: 16 IU/L (ref 17–59)
ATYPICAL LYMPHOCYTE FLAG: 10 (ref 0–99)
BILIRUB SERPL-MCNC: 1.5 MG/DL (ref 0.1–1.4)
CALCIUM SERPL-MCNC: 9 MG/DL (ref 8.5–10.4)
CHLORIDE SERPL-SCNC: 104 MEQ/L (ref 97–110)
CO2 SERPL-SCNC: 22 MEQ/L (ref 22–31)
CREAT SERPL-MCNC: 1 MG/DL (ref 0.7–1.3)
ERYTHROCYTE [DISTWIDTH] IN BLOOD BY AUTOMATED COUNT: 12.2 % (ref 11.5–15.2)
FRAGMENT RBC FLAG: 0 (ref 0–99)
GFR SERPL CREATININE-BSD FRML MDRD: > 60 ML/MIN/{1.73_M2}
GLUCOSE SERPL-MCNC: 97 MG/DL (ref 70–100)
HCT VFR BLD CALC: 42.7 % (ref 40–51)
HGB BLD-MCNC: 14.7 G/DL (ref 13.7–17.5)
LEFT SHIFT FLG: 0 (ref 0–99)
LIPEMIA HEMOLYSIS FLAG: 90 (ref 0–99)
MAGNESIUM SERPL-MCNC: 1.9 MG/DL (ref 1.6–2.3)
MCH RBC BLDCO QN: 30.9 PG (ref 27.9–34.1)
MCHC RBC AUTO-ENTMCNC: 34.4 G/DL (ref 32.4–36.7)
MCV RBC AUTO: 89.9 FL (ref 81.5–99.8)
NRBC-AUTO%: 0 % (ref 0–0.2)
PLATELET # BLD: 232 10^3/UL (ref 150–400)
PLATELET CLUMPS FLAG: 10 (ref 0–99)
PMV BLD AUTO: 9.5 FL (ref 8.7–11.7)
POTASSIUM SERPL-SCNC: 4 MEQ/L (ref 3.5–5.2)
PROT SERPL-MCNC: 5.9 G/DL (ref 6.3–8.2)
RBC # BLD AUTO: 4.75 10^6/UL (ref 4.4–6.38)
SODIUM SERPL-SCNC: 135 MEQ/L (ref 134–144)
TROPONIN I SERPL-MCNC: 0.14 NG/ML (ref 0–0.03)

## 2017-05-14 PROCEDURE — 02703ZZ DILATION OF CORONARY ARTERY, ONE ARTERY, PERCUTANEOUS APPROACH: ICD-10-PCS | Performed by: INTERNAL MEDICINE

## 2017-05-14 PROCEDURE — B240ZZ3 ULTRASONOGRAPHY OF SINGLE CORONARY ARTERY, INTRAVASCULAR: ICD-10-PCS | Performed by: INTERNAL MEDICINE

## 2017-05-14 PROCEDURE — B245ZZ4 ULTRASONOGRAPHY OF LEFT HEART, TRANSESOPHAGEAL: ICD-10-PCS | Performed by: INTERNAL MEDICINE

## 2017-05-14 RX ADMIN — CARVEDILOL SCH MG: 3.12 TABLET, FILM COATED ORAL at 09:26

## 2017-05-14 RX ADMIN — FAMOTIDINE SCH MG: 20 TABLET, FILM COATED ORAL at 09:25

## 2017-05-14 RX ADMIN — PANTOPRAZOLE SODIUM SCH MG: 40 TABLET, DELAYED RELEASE ORAL at 20:40

## 2017-05-14 RX ADMIN — ASPIRIN SCH MG: 81 TABLET, DELAYED RELEASE ORAL at 09:26

## 2017-05-14 RX ADMIN — IRBESARTAN SCH MG: 150 TABLET ORAL at 10:34

## 2017-05-14 RX ADMIN — ACETAMINOPHEN PRN MG: 325 TABLET ORAL at 02:09

## 2017-05-14 RX ADMIN — SODIUM CHLORIDE SCH MLS: 900 INJECTION, SOLUTION INTRAVENOUS at 03:14

## 2017-05-14 RX ADMIN — ATORVASTATIN CALCIUM SCH MG: 40 TABLET, FILM COATED ORAL at 09:25

## 2017-05-14 RX ADMIN — PANTOPRAZOLE SODIUM SCH MG: 40 TABLET, DELAYED RELEASE ORAL at 09:25

## 2017-05-14 RX ADMIN — CARVEDILOL SCH MG: 3.12 TABLET, FILM COATED ORAL at 18:14

## 2017-05-14 RX ADMIN — PRASUGREL HYDROCHLORIDE SCH MG: 10 TABLET, FILM COATED ORAL at 09:25

## 2017-05-14 NOTE — GCON
[f rep st]



                                                                    CONSULTATION





NEUROLOGIC CONSULTATION





HISTORY OF PRESENT ILLNESS:  The patient is a 61-year-old gentleman who I am asked to see in neurolo
gi consultation regarding an episode yesterday of acute headache with visual disturbance and associ
ated slurred speech and confusion and disorientation.  History is obtained from review of the medica
l record and discussions with the patient as well as his wife who was with him when all this occurre
d.  He was originally admitted to the hospital on May 9 with complaints of exacerbation of chronic n
ausea and epigastric pain.  He has subsequently gone through workup and been found to have coronary 
disease and had stenting performed by Dr. Douglas on the 12th.  He was doing well in general until an
 episode which occurred yesterday.  He was attended to by Dr. Vasyl Badillo.  He was with his wife out
 on the patio and was complaining of severe headache.  He got Excedrin and did not get any clear rel
ief.  He began to have some visual changes which he says were consistent with spots in the periphery
 and some altered or loss of vision.  It is hard for him to remember exactly how it happened, and he
 says that some of it he does not remember but his wife clarified that during this evolving 30-40 mi
nutes his speech became impaired and he did not recognize her.  There was a stroke alert and he was 
assessed having normal strength and sensation, and he was still having some impaired speech with a b
lood pressure of 155.  



Head CT was obtained and reported to show possible areas of acute ischemia in the right frontal lobe
, but it was not entirely clear as to whether that was ischemia or not.  [facility] was consulted and d
ecided he would not be a candidate for tPA because symptoms were resolving.  It was thought that it 
probably was complex migraine with the temporary neurologic symptoms.  The patient went to the ICU a
nd subsequently has been stable from a neurologic standpoint with no headache or other neurologic sy
mptoms.  He had a CT angiogram of the head and neck.  These studies showed no flow limiting stenoses
.



PAST MEDICAL HISTORY:  Notable for some migraine many years in the past and very occasionally has ha
d visual auras without headache.  Past medical history of reflux with kidney stone.  He has had knee
 surgery.



FAMILY HISTORY:  Colon cancer in his father.



SOCIAL HISTORY:  No smoking.  Maybe 1 or 2 glasses of wine per night.  He is retired.  He is normall
y very physically active.



MEDICATIONS ON ADMISSION:  Nexium, Zofran, Phenergan.  Currently he is on aspirin, Lipitor, p.r.n. m
edications.



REVIEW OF SYSTEMS:  Unremarkable except for that noted above.



PHYSICAL EXAMINATION:  VITAL SIGNS:  Blood pressure 163/91, pulse of 56, respirations 16.  The patie
nt is afebrile. GENERAL:  He is well developed, in no acute distress.  EYES:  Clear.  NECK:  Supple.
  No bruits or masses.  CARDIAC:  Regular rate and rhythm.  No murmur.  NEUROLOGIC:  He is awake, al
ert, and attentive.  Pupils 3 mm and reactive.  Extraocular movements are intact.  Normal facial sen
sation and strength.  Motor exam reveals normal muscle bulk and tone with 5/5 strength.  Sensation i
s preserved for temperature and light touch.  Reflexes are 1+ and symmetric.



DIAGNOSTIC STUDIES:  As outlined above.  Also unremarkable CBC and electrolytes.  Urinalysis is unre
markable.  I have reviewed the head CT and agree that there is changes of microvascular disease and 
this question raised of relative hypodensity in the right subcortical frontal region may or may not 
represent an area of acute ischemic change.  I reviewed the head CT from the 9th as well and in appr
oximately the same area there is that same relative hypodensity or quite similar.  Therefore it is d
ifficult to definitively say how much of a change that is and whether actually represents an acute i
schemic event or not, but ultimately MRI would be best but we can't do that because of the recent st
ents.



IMPRESSION:  The patient has experienced an acute headache with complex neurologic symptoms that hav
e included dysarthria, confusion, visual disturbance.  Differential considerations include complex m
igraine phenomena with aura as well as possible acute ischemia, and we will need to do some further 
followup imaging with repeat head CT tomorrow to see if we are seeing any evolution in that right fr
ontal region that would help us be more definitive on stroke, since we cannot obtain a brain MRI at 
this point, to ultimately answer the question.  He is already on maximum medical therapy which is ap
propriate, and Dr. Douglas is going to further look into any potential cardiac source with transesoph
ageal echocardiogram today.  My partner will follow up tomorrow for further clarification.  I remain
 available if any other questions arise.



TOTAL TIME:  55 minutes of unit time on this case.





Job #:  [id number]

## 2017-05-14 NOTE — HOSPPROG
Hospitalist Progress Note


Assessment/Plan: 





#Migraine with dysarthria: evaluated by Calderon Singletary last night and c/w migraine 

and not t-PA candidate. CT with possible acute frontal lobe infarct, CTA head/

neck normal. Reviewed imaging with Dr. Gipson and appears to have acute CVA. 

Cannot do MRI with recent stents. 


-Repeat CTH in morning. On appropriate medical therapy. PT/OT


-SHELLY did not reveal thrombus





#CAD: s/p RHONA to RCA, LAD x2.  Repeat angiogram today with ST depressions 

showed patent stents; LAD stent repositioned. Cards recs Effient, ASA (stop 

after 1 month) 





#Persistent nausea: initially improved after cath and stents. Now 3/10. Repeat 

repeat EKG with ST depression. Repeat angiogram reassuring and no thrombus on 

SHELLY


-EGD with small hiatal hernia. Biopsies pending for celiac/eosinophiliia. Abd U/

S with hepatic cysts, right renal cysts. CT unrevealing


-Gastric emptying study once cardiac issues evaluated





#Atrial fibrillation: provoked during stress test. Now in NSR. SHELLY negative. If 

episode last night was true CVA, then he has CHADsVASC score 3. Cards 

recommends full anticoagulation with Eliquis 5mg BID. Will start tomorrow if 

cath site stable





#GERD: PPI





#Diet: cardiac





#DVT ppx: on Eliquis





#Disp: cont ICU monitoring for neuro checks, telemetry


Subjective: nauseated this morning. "Stitch in right chest". No SOB


Objective: 


 Vital Signs











Temp Pulse Resp BP Pulse Ox


 


 36.6 C   54 L  12   140/78 H  98 


 


 05/14/17 05:00  05/14/17 08:00  05/14/17 06:00  05/14/17 08:00  05/14/17 08:00








 Laboratory Results





 05/14/17 05:30 





 05/14/17 05:30 





 











 05/13/17 05/14/17 05/15/17





 05:59 05:59 05:59


 


Intake Total 2100 2017 


 


Output Total 1200 750 


 


Balance 900 1267 








 











PT  15.7 SEC (12.0-15.0)  H  05/12/17  04:39    


 


INR  1.25  (0.83-1.16)  H  05/12/17  04:39    














- Physical Exam


Constitutional: no apparent distress, other (tired-appearing)


Eyes: PERRL, EOMI


Ears, Nose, Mouth, Throat: moist mucous membranes, hearing normal


Cardiovascular: regular rate and rhythym, no murmur, rub, or gallop, edema (no 

edema)


Respiratory: no respiratory distress, no rales or rhonchi


Gastrointestinal: soft, non-tender abdomen


Skin: warm


Musculoskeletal: full muscle strength


Neurologic: AAOx3


Psychiatric: interacting appropriately, flat affect





ICD10 Worksheet


Patient Problems: 


 Problems











Problem Status Onset


 


Nausea Acute  


 


Abdominal pain Acute

## 2017-05-14 NOTE — CPIP
[f rep st]



                                                       INVASIVE CARDIAC PROCEDURE





DATE OF PROCEDURE:  05/14/2017



PROCEDURE PERFORMED:  

1.  Coronary catheterization.

2.  Selective coronary angiography.

3.  Intravascular ultrasound.

4.  Balloon angioplasty of the mid and proximal left anterior descending were accomplished with the 
6.0 Polk XL balloon.

5.  Transesophageal echocardiogram.



COMPLICATIONS:  None.



:  Wil Douglas MD.



INDICATION FOR THE PROCEDURE:  TIA/stroke-like symptoms in a patient with documented paroxysmal atri
al fibrillation.  SHELLY indication was to rule out left atrial appendage thrombus.  Indication for car
dia cath and intravascular ultrasound is unstable angina symptoms with ongoing nausea and EKG amador
es suggestive of ischemia in a patient with stents placed on Friday, 1 to the right coronary artery 
proximally and overlapping stents to the proximal and mid LAD.



DESCRIPTION OF PROCEDURE:  After informed consent was obtained, n.p.o. status was confirmed.  The re
gion of the left wrist was cleaned, prepped, and draped in sterile fashion.  Approximately 5 cc of 1
% lidocaine were utilized for local anesthesia.  Plethysmography and trace-assisted Brandon test was p
erformed, documenting dual arterial supply to the left hand and index finger.  A 6-Macanese slender gl
cici sheath was placed using modified Seldinger technique.  J wire was advanced into the ascending ao
rta under direct fluoroscopic guidance.  Afterward, a JR4 diagnostic catheter was used to perform se
lective coronary angiography and coronary catheterization of the right coronary artery, documenting 
a widely patent stent with no evidence of abnormal flow characteristics.  The J-wire was used to exc
hange for a 6-Macanese 3.5 CLS guiding catheter.  The patient was given an adequate dose of heparin to
 achieve an ACT of 291 seconds.  After adequate anticoagulation had been achieved, an Intuition wire
 was advanced down the LAD.  An intravascular ultrasound was then performed.  This documented a sten
t strut, which was not well apposed to the wall in the overlapping segment between the 1st and 2nd s
tent in the LAD.  In addition, in an aneurysmal segment of the LAD proximally, the final stent strut
s and ring were not well apposed to the aneurysm wall.  The vessel was clearly larger than the 5.0 s
ize that it had been previously dilated to on Friday afternoon.  Given this indication and intravasc
ular ultrasound findings a, 6.0 x 15 Polk balloon was placed in the mid LAD at the overlapping s
egment and inflated to 4 atmospheres of pressure, given that was oversized.  We then pulled the ball
oon back and placed the shoulder of the balloon within the aneurysmal segment and inflated this to a
 maximum pressure of 14 atmospheres.  The balloon was then removed, and postoperative intravascular 
ultrasound was obtained. 



Intravascular ultrasound post procedure documented widely patent stents with good stent geometry and
 vessel wall apposition.  The stent strut within the mid LAD was now well apposed to the vessel wall
, and the proximal stent and proximal portion of that stent was noted to be well opposed to the aneu
rysm wall.  Angiography postprocedure documented excellent and KHANG-3 flow with no evidence of dye s
treaming or dye "hang up" within the proximal segment of the LAD, which was present prior to the int
ravascular ultrasound guided balloon angioplasty.  The patient tolerated procedure well and returned
 to the ICU in good and stable condition. 



Prior to removal from the cath lab, the patient was sedated with etomidate and underwent successful 
transesophageal echocardiogram.  The findings were pertinent for prominent trabeculation and echo sh
adowing within the left atrial appendage.  A left atrial thrombus was not definitively identified, h
owever, it was also not definitively ruled out, in my view.  The LV systolic function was normal.  T
he mitral valve was normal without significant regurgitation.  Aortic valve was trileaflet.  It did 
not appear that there was severe tricuspid regurgitation.  The patient was somewhat combative on the
 table, so the procedure was aborted, essentially with complete interrogation of the left atrial master
endage only.  There was no evidence of right-to-left shunting on a single intravenous contrast injec
tion without Valsalva. 



The patient should be admitted overnight, kept on bedrest with a TR band in place for the next 3 bennie
rs.  I think the patient should be placed on full-dose anticoagulation with Eliquis at 5 mg b.i.d., 
as well as Effient 10 mg daily and low-dose aspirin 81 mg daily for the next month after which the a
spirin should be discontinued, and the patient continued on Eliquis 5 mg p.o. twice daily, and Amanda
nt 10 mg p.o. daily and lesser signs of active bleeding.  I do believe the episode that he had yeste
rday may have been more significant than a complex migraine, and may be, in fact, related to a TIA. 
 If the patient has a TIA and hypertension, that would give him a CHADS2-VASc score of 3.  Patient's
 with a CHADS2-VASc score of 3 are thought to benefit from full-dose anticoagulation to reduce the r
isk of stroke, with Eliquis being in my view, the superior agent.  Obviously, the risk of being on a
 so-called triple therapy with aspirin, Effient, and Eliquis would be bleeding.  The patient has not
 had a history of gastrointestinal bleeding or any blood dyscrasia or disorder that would require a 
blood transfusion.  I have explained the benefits of Eliquis in this setting to be a reduction in th
e risk of thromboembolic events, especially a reduction in the event of stroke and TIA.  Because of 
his history of paroxysmal atrial fibrillation, the risk of being on the medication in addition to as
pirin and Effient is obviously bleeding.  Again, the patient will be admitted to the ICU following c
ardiac catheterization, intravascular ultrasound-guided balloon angioplasty of the previously placed
 stents, and transesophageal echocardiogram in good and stable condition.





Job #:  [id number]

## 2017-05-14 NOTE — CPEKG
Heart Rate: 54

RR Interval: 1111

P-R Interval: 156

QRSD Interval: 102

QT Interval: 488

QTC Interval: 463

P Axis: 70

QRS Axis: 67

T Wave Axis: 92

EKG Severity - ABNORMAL ECG -

EKG Impression: SINUS RHYTHM

EKG Impression: REPOL ABNRM SUGGESTS ISCHEMIA, ANT-LAT LEADS

Electronically Signed By: Tegan Shrestha 15-May-2017 07:03:36

## 2017-05-15 LAB
ALBUMIN SERPL-MCNC: 3.4 G/DL (ref 3.5–5)
ALP SERPL-CCNC: 35 IU/L (ref 38–126)
ALT SERPL-CCNC: 27 IU/L (ref 21–72)
ANION GAP SERPL CALC-SCNC: 8 MEQ/L (ref 8–16)
AST SERPL-CCNC: 32 IU/L (ref 17–59)
BILIRUB SERPL-MCNC: 1.8 MG/DL (ref 0.1–1.4)
CALCIUM SERPL-MCNC: 8.5 MG/DL (ref 8.5–10.4)
CHLORIDE SERPL-SCNC: 105 MEQ/L (ref 97–110)
CO2 SERPL-SCNC: 22 MEQ/L (ref 22–31)
CREAT SERPL-MCNC: 1.1 MG/DL (ref 0.7–1.3)
ERYTHROCYTE [DISTWIDTH] IN BLOOD BY AUTOMATED COUNT: 12.4 % (ref 11.5–15.2)
GFR SERPL CREATININE-BSD FRML MDRD: > 60 ML/MIN/{1.73_M2}
GLUCOSE SERPL-MCNC: 89 MG/DL (ref 70–100)
HCT VFR BLD CALC: 42.2 % (ref 40–51)
HGB BLD-MCNC: 14.6 G/DL (ref 13.7–17.5)
LIPEMIA HEMOLYSIS FLAG: 90 (ref 0–99)
MCH RBC BLDCO QN: 30.9 PG (ref 27.9–34.1)
MCHC RBC AUTO-ENTMCNC: 34.6 G/DL (ref 32.4–36.7)
MCV RBC AUTO: 89.2 FL (ref 81.5–99.8)
PLATELET # BLD: 223 10^3/UL (ref 150–400)
PLATELET CLUMPS FLAG: 0 (ref 0–99)
POTASSIUM SERPL-SCNC: 4.9 MEQ/L (ref 3.5–5.2)
PROT SERPL-MCNC: 6.2 G/DL (ref 6.3–8.2)
RBC # BLD AUTO: 4.73 10^6/UL (ref 4.4–6.38)
SODIUM SERPL-SCNC: 135 MEQ/L (ref 134–144)
SPECIMEN HEMOLYSIS: 206

## 2017-05-15 RX ADMIN — CARVEDILOL SCH MG: 3.12 TABLET, FILM COATED ORAL at 18:12

## 2017-05-15 RX ADMIN — ASPIRIN SCH MG: 81 TABLET, DELAYED RELEASE ORAL at 08:52

## 2017-05-15 RX ADMIN — ATORVASTATIN CALCIUM SCH MG: 40 TABLET, FILM COATED ORAL at 08:51

## 2017-05-15 RX ADMIN — PANTOPRAZOLE SODIUM SCH MG: 40 TABLET, DELAYED RELEASE ORAL at 21:49

## 2017-05-15 RX ADMIN — APIXABAN SCH MG: 5 TABLET, FILM COATED ORAL at 21:49

## 2017-05-15 RX ADMIN — FAMOTIDINE SCH MG: 20 TABLET, FILM COATED ORAL at 08:51

## 2017-05-15 RX ADMIN — CARVEDILOL SCH MG: 3.12 TABLET, FILM COATED ORAL at 08:53

## 2017-05-15 RX ADMIN — IRBESARTAN SCH: 150 TABLET ORAL at 10:39

## 2017-05-15 RX ADMIN — SODIUM CHLORIDE SCH MLS: 900 INJECTION, SOLUTION INTRAVENOUS at 02:21

## 2017-05-15 RX ADMIN — PANTOPRAZOLE SODIUM SCH MG: 40 TABLET, DELAYED RELEASE ORAL at 08:52

## 2017-05-15 RX ADMIN — PRASUGREL HYDROCHLORIDE SCH MG: 10 TABLET, FILM COATED ORAL at 08:51

## 2017-05-15 NOTE — PDINTPN
Intensivist Progress Note


Assessment/Plan: 


Assessment/plan:





61 M admitted with 1-2 years of idiopathic nausea and found to have cardiac 

ischemia on stress test. Had cath with stents placed in LAD and RCA with 

resolution of nausea, but developed poor speech with severe headache (initially 

thought migraine) and CT with possible CVA. BP has been borderline with coreg 

and ARB. 





* CAD s/p stents with revision. currently stable though need to hold ARB. NICOM 

shows only 9% change in SVRI, so not likely volume. BP stable at the moment, 

but would favor additional observation. 


* CVA? neuro following. MRI pending. 


* Nausea- very likely related to CAD as anginal equivalent, but reasonable to 

check gastric emptying prior to discharge. Previous extensive GI work-up 

negative per report. 

















Objective: 





 Vital Signs











Temp Pulse Resp BP Pulse Ox


 


 36.6 C   78   18   97/62 L  99 


 


 05/15/17 15:36  05/15/17 15:36  05/15/17 15:36  05/15/17 15:36  05/15/17 15:36








 Laboratory Results





 05/15/17 05:40 





 05/15/17 05:40 





 











 05/14/17 05/15/17 05/16/17





 05:59 05:59 05:59


 


Intake Total 2017 1023 


 


Output Total 750 1450 


 


Balance 1267 -427 








 











PT  15.7 SEC (12.0-15.0)  H  05/12/17  04:39    


 


INR  1.25  (0.83-1.16)  H  05/12/17  04:39    














Physical Exam





- Physical Exam


General Appearance: WD/WN, alert, no apparent distress


EENT: PERRL/EOMI


Neck: supple


Respiratory: lungs clear, normal breath sounds, No respiratory distress, No 

rales, No rhonchi


Cardiac/Chest: normal peripheral pulses, regular rate, rhythm


Abdomen: non-tender, soft, No distended


Skin: normal color, warm/dry


Lymphatic: no adenopathy


Extremities: non-tender, No pedal edema


Neuro/Psych: alert, normal mood/affect, oriented x 3





ICD10 Worksheet


Patient Problems: 


 Problems











Problem Status Onset


 


Nausea Acute  


 


Abdominal pain Acute

## 2017-05-15 NOTE — PDCARPN
Cardiology Progress Note


Assessment/Plan: 


Coronary Artery Disease- s/p PCI of LAD and RCA on 5/12 with return to cath lab 

with additional post-dilatation of LAD stents on 5/15. No angina or CHF.





Dual antiplatelet therapy wiht aspirin and Effient for 12 months. (If systemic 

anticoagulation is initiated, will plan to DC the aspirin component of his DAPT 

in approximately one month.)





Aggressive secondary prevention; already follows relatively healthy diet and 

exercise patterns.





Statin therapy initiated with goal LDL less than 70.





Continue beta blocker; ARB on hold due to low B/P.








Paroxysmal Atrial Fibrillation- occurred during his exercise treadmill test. 

Has not had a recurrence. No prior history of palpitations.





Will plan on starting Eliquis tomorrow. (See discussion of possible CVA below.)








Possible CVA- significant neuro symptoms Saturday night initially attributed to 

a complex migraine. Head CT suggested a hypodensity in the right frontal lobe. 

MRI performed today did not corroborate the CT findings but did suggest a 

possible small lacunar infarct in the right occipital lobe.





Will discuss systemic anticoagulation with the patient and his wife tomorrow.








Nausea- doing better today. Did experience some nausea last night. May have 

been related to medications given for his return trip to the cath lab yesterday.





? cyclic vomiting syndrome. (See hospitalist progress note)





Doubt this issue is related to his CAD. 











05/15/17 16:39





Subjective: 





No chest pain, dyspnea, or palpitations.


Objective: 





 Vital Signs (8 Hrs)











  Temp Pulse Resp BP Pulse Ox


 


 05/15/17 15:36  36.6 C  78  18  97/62 L  99


 


 05/15/17 13:36   60  16  91/45 L  96


 


 05/15/17 11:55  36.5 C  63  16  120/97 H  99


 


 05/15/17 11:00   58 L  10 L  117/55 L 


 


 05/15/17 10:00   60  19  93/53 L  95


 


 05/15/17 09:00   72  18  93/57 L  96


 


 05/15/17 08:53   66   96/61 L 








 Intake/Output (24 Hrs)











 05/14/17 05/15/17 05/16/17





 05:59 05:59 05:59


 


Intake Total 2017 1023 


 


Output Total 750 1450 


 


Balance 1267 -427 


 


Intake:   


 


  Oral (ml) 850 600 


 


  IV Intake (ml) 1167 423 


 


Output:   


 


  Urine (ml) 750 1450 


 


    Toilet 750 550 


 


    Urinal  900 


 


Other:   


 


  Weight  91.081 kg 


 


  Number of Voids   


 


    Toilet  1 


 


  Number of Stools   


 


    Toilet  1 











Result Diagrams: 


 05/15/17 05:40





 05/15/17 05:40


Cardiac Labs: 





 Cardiac Lab Results (72 Hrs)











  05/14/17 05/14/17 05/13/17





  09:10 05:30 20:15


 


Troponin I  0.122 H  0.143 H  0.179 H














- Physical Exam


Constitutional: healthy appearing, no apparent distress


Eyes: anicteric sclera


Ears, Nose, Mouth, Throat: moist mucous membranes


Cardiovascular: regular rate and rhythm, no murmurs, no rubs, no gallops


Respiratory: clear to auscultate bilat


Gastrointestinal: normoactive bowel sounds, no tenderness, no masses


Skin: no rashes, no edema


Neurologic: AAOx3


Psychiatric: interactive, not anxious





ICD10 Worksheet


Patient Problems: 


 Problems











Problem Status Onset


 


Abdominal pain Acute  


 


Nausea Acute

## 2017-05-15 NOTE — NEUROPROG
Assessment: 


1. Coronary artery disease, status post stenting


 2. History of  paroxysmally atrial fibrillation


3.  symptomatic complex migraine


 4. Tiny diffusion-weighted abnormality in right occipital lobe





35 total minutes floor time; over 50% counseling regarding the patient's  

neurologic history.  The patient's mother and daughter both have migraine with 

aura with focal neurologic symptoms.





  The patient had a complex migraine with visual disturbance around 24 hours 

after cardiac catheterization.





  CT angiography of the head and neck showed no significant vascular 

abnormalities.





  MRI of the brain shows a tiny diffusion-weighted abnormality in the right 

occipital lobe.








  I counseled the patient that occipital insults can cause symptomatic 

migraines.





  Therefore, he may have had a small cardioembolic event causing a symptomatic 

migraine but no neurologic deficits referable to this lesion.





 based on his entire  cardiac history and run of atrial fibrillation;  I  agree 

with oral anticoagulation for this patient.





  He can be started on oral anticoagulation any time based on the tiny size of 

the diffusion-weighted change.





 no further recommendations now.  Disposition per Internal Medicine and 

Cardiology.  We will continue to follow up p.r.n.  Please do not hesitate to 

call with any questions or changes in neurologic status.


Subjective: 


  No new symptoms, migraine resolved


Objective: 





 Vital Signs











Temp Pulse Resp BP Pulse Ox


 


 36.6 C   78   18   97/62 L  99 


 


 05/15/17 15:36  05/15/17 15:36  05/15/17 15:36  05/15/17 15:36  05/15/17 15:36








 Laboratory Results





 05/15/17 05:40 





 05/15/17 05:40 





 











 05/14/17 05/15/17 05/16/17





 05:59 05:59 05:59


 


Intake Total 2017 1023 


 


Output Total 750 1450 


 


Balance 1267 -427 








 











PT  15.7 SEC (12.0-15.0)  H  05/12/17  04:39    


 


INR  1.25  (0.83-1.16)  H  05/12/17  04:39    








 higher mental function - normal


 cranial nerve exam normal, including visual field exam


 motor exam - normal


Allergies/Adverse Reactions: 


 





No Known Allergies Allergy (Verified 05/09/17 11:54)

## 2017-05-15 NOTE — HOSPPROG
Hospitalist Progress Note


Assessment/Plan: 





#Migraine with dysarthria: resolved. Suspect migraine. Daughter with similar 

hx. CTH with small acute frontal lobe infarct. Trying to further assess with 

MRI. Cards cleared him for MRI, but radiology ensuring compatibility. If not, 

will proceed with CTH. 


-SHELLY did not reveal thrombus





#CAD: s/p RHONA to RCA, LAD x2.  Repeat angiogram 5/14 with ST depressions showed 

patent stents; LAD stent repositioned. Cards recs Effient, ASA (stop after 1 

month) 





#Persistent nausea: suspected possibly anginal equivalent. Improved since 

stents. Given long history, will proceed with GES tomorrow.


-Repeat angiogram reassuring and no thrombus on SHELLY


-EGD with small hiatal hernia. Biopsies pending for celiac/eosinophiliia. Abd U/

S with hepatic cysts, right renal cysts. CT unrevealing


-Gastric emptying study once cardiac issues evaluated





#Atrial fibrillation: provoked during stress test. Now in NSR. SHELLY negative. If 

episode last night was true CVA, then he has CHADsVASC score 3. Cards planning 

on full anticoagulation with Eliquis 5mg BID. Family wants to wait for MRI 

before starting





#GERD: PPI





#Hypotension: likely related to Avapro.  This has been discontinued





#Diet: cardiac





#DVT ppx: on Eliquis





#Disp: cont ICU monitoring for neuro checks, telemetry


Subjective: no nausea today. Having BMs


Objective: 


 Vital Signs











Temp Pulse Resp BP Pulse Ox


 


 36.5 C   63   16   120/97 H  99 


 


 05/15/17 11:55  05/15/17 11:55  05/15/17 11:55  05/15/17 11:55  05/15/17 11:55








 Laboratory Results





 05/15/17 05:40 





 05/15/17 05:40 





 











 05/14/17 05/15/17 05/16/17





 05:59 05:59 05:59


 


Intake Total 2017 1023 


 


Output Total 750 1450 


 


Balance 1267 -427 








 











PT  15.7 SEC (12.0-15.0)  H  05/12/17  04:39    


 


INR  1.25  (0.83-1.16)  H  05/12/17  04:39    














- Physical Exam


Constitutional: no apparent distress


Cardiovascular: regular rate and rhythym, no murmur, rub, or gallop


Respiratory: no respiratory distress, no rales or rhonchi


Gastrointestinal: normoactive bowel sounds, soft, non-tender abdomen, no 

palpable masses


Musculoskeletal: full muscle strength, other (radial cath site without hematoma

, good pulse)


Neurologic: AAOx3, CN II-XII Intact


Psychiatric: interacting appropriately





ICD10 Worksheet


Patient Problems: 


 Problems











Problem Status Onset


 


Nausea Acute  


 


Abdominal pain Acute

## 2017-05-16 VITALS
TEMPERATURE: 97.8 F | RESPIRATION RATE: 12 BRPM | DIASTOLIC BLOOD PRESSURE: 62 MMHG | SYSTOLIC BLOOD PRESSURE: 91 MMHG | HEART RATE: 54 BPM

## 2017-05-16 VITALS — OXYGEN SATURATION: 100 %

## 2017-05-16 RX ADMIN — FAMOTIDINE SCH MG: 20 TABLET, FILM COATED ORAL at 13:18

## 2017-05-16 RX ADMIN — ASPIRIN SCH MG: 81 TABLET, DELAYED RELEASE ORAL at 13:18

## 2017-05-16 RX ADMIN — PRASUGREL HYDROCHLORIDE SCH MG: 10 TABLET, FILM COATED ORAL at 13:18

## 2017-05-16 RX ADMIN — CARVEDILOL SCH MG: 3.12 TABLET, FILM COATED ORAL at 13:16

## 2017-05-16 RX ADMIN — APIXABAN SCH MG: 5 TABLET, FILM COATED ORAL at 13:17

## 2017-05-16 RX ADMIN — ATORVASTATIN CALCIUM SCH MG: 40 TABLET, FILM COATED ORAL at 13:18

## 2017-05-16 RX ADMIN — PANTOPRAZOLE SODIUM SCH MG: 40 TABLET, DELAYED RELEASE ORAL at 13:18

## 2017-05-16 NOTE — GDS
[f 
rep st]



                                                             DISCHARGE SUMMARY





DISCHARGE DIAGNOSES:  

1.  Coronary artery disease, status post stenting. 

2.  History of paroxysmal atrial fibrillation.  

3.  Symptomatic complex migraine. 

4.  Occipital lacunar infarct

5.  Chronic nausea.



CONSULTATIONS:  

1.  Cardiology.

2.  Neurology.



PROCEDURES:  Cardiac cath, 05/12/2017:  80% mid LAD lesion, 70% proximal RCA 
lesion, status post stenting to LAD and RCA.  



Repeat cardiac cath 05/14/2017:  Stent strut was not well apposed to the wall 
and overlapping segments between the 1st and 2nd stent in the LAD.  Status post 
balloon angioplasty with adjustment of the stents.



HISTORY OF PRESENT ILLNESS:  The patient is a 61-year-old male with a history 
of a small hiatal hernia and esophagitis who presented to the emergency room 
with acute exacerbation of his chronic epigastric pain and nausea.  He has been 
followed by Dr. Granda in GI for the past several months for recurrent 
epigastric pain (since 09/2016).  He has undergone an extensive evaluation, 
including CT of the abdomen and pelvis showing a small hiatal hernia,upper GI 
series 10/2016, and esophagitis on EGD in January 2017. Started on a PPI with 
general improvement until about 2 weeks ago, when the pain and nausea began to 
recur.  He went to his primary care physician for evaluation and was prescribed 
Nexium and Carafate, and patient reports shortly after taking these he had 
intense nausea and pain.  The pain is associated with hypersalivation and 
severe nausea.  Upon arrival here in the emergency room, the patient had a CT 
of abdomen and pelvis that was unremarkable. 



GI was consulted and underwent an EGD that was unremarkable.  CT showed chronic 
kidney stones.  Stress test was obtained for possible anginal equivalent and 
this was abnormal.  The patient underwent a Lexiscan that showed a low normal 
EF of 51% with mild diffuse hypokinesis of the left ventricle.



HOSPITAL COURSE:  

1.  CAD: initially presented with nausea. thought to be anginal equivalent.  A 
Lexiscan showed hypokinesis of the left ventricle, so underwent catheterization 
with RHONA to LAD and RCA.  Day after stent, patient had recurrent nausea with ST 
depressions on EKG and had another cath and LAD stents was 
repositioned.Continue Effient, Coreg, aspirin, and statin, p.r.n. nitroglycerin.

2.  Right occipital infarct: developed a severe headache on 05/13 with 
dysarthria and confusion. CT showed possible acute subcortical infarct in the 
right frontal lobe.  The patient's symptoms had quickly resolved.  Head and 
neck CTA were negative.  Brain MRI that demonstrated a possible tiny lacunar 
infarct in the posteromedial right occipital lobe.  Neurology consulted and 
possibly due to embolic source given paroxysmal atrial fibrillation during 
stress test. Started on Eliquis and to follow up with Neurology.  Continue 
statin.

3.  Nausea:  Again, suspect this is due to his cardiac issues.  Patient has had 
an extensive evaluation, including a negative EGD, ultrasound, CT abdomen with 
chronic kidney stones, as well as a gastric emptying test that shows mild 
gastric retention.  Patient may resume his PPI and follow up with his 
gastroenterologist.

4. Paroxysmal atrial fibrillation: provoked during stress. Now in NSR. Eliquis



DISPOSITION:  Patient is stable for discharge.



MEDICATIONS:  

See medication reconciliation.



FOLLOWUP:  

1.  Dr. Douglas.

2.  Neurology.  



Time spent on discharge:  Greater than 60 minutes counseling patient and family 
on followup, plan, and medications.





Job #:  [id number]

MTDD

## 2017-05-16 NOTE — HOSPPROG
Hospitalist Progress Note


Assessment/Plan: 





#Migraine with dysarthria: resolved. Suspect migraine. Daughter with similar 

hx. CTH with small acute frontal lobe infarct. Trying to further assess with 

MRI. Cards cleared him for MRI, but radiology ensuring compatibility. If not, 

will proceed with CTH. 


-SHELLY did not reveal thrombus





#Small lacuna infarct right occipital lobe: possibly embolic with paroxysmal a 

fib. Start Eliquis





#CAD: s/p RHONA to RCA, LAD x2.  Repeat angiogram 5/14 with ST depressions showed 

patent stents; LAD stent repositioned. Cards recs Effient, ASA (stop after 1 

month) 





#Persistent nausea: suspected possibly anginal equivalent. Improved since 

stents. Given long history, will proceed with GES tomorrow.


-Repeat angiogram reassuring and no thrombus on SHELLY


-EGD with small hiatal hernia. Biopsies show chronic inflammation, negative H 

pylori. Abd U/S with hepatic cysts, right renal cysts. CT unrevealing


-Gastric emptying study with min retention. FU GI





#Atrial fibrillation: provoked during stress test. Now in NSR. SHELLY negative. If 

episode last night was true CVA, then he has CHADsVASC score 3. Cards planning 

on full anticoagulation with Eliquis 5mg BID. Family wants to wait for MRI 

before starting





#GERD: PPI





#Hypotension: likely related to Avapro.  This has been discontinued





#Diet: cardiac





#DVT ppx: on Eliquis





#Disp: DC today


Subjective: no nausea, CP or SOB


Objective: 


 Vital Signs











Temp Pulse Resp BP Pulse Ox


 


 36.6 C   54 L  12   91/62 L  100 


 


 05/16/17 12:00  05/16/17 12:00  05/16/17 12:00  05/16/17 12:00  05/16/17 12:00








 Laboratory Results





 05/15/17 05:40 





 05/15/17 05:40 





 











 05/15/17 05/16/17 05/17/17





 05:59 05:59 05:59


 


Intake Total 1023 1560 


 


Output Total 1450  


 


Balance -427 1560 








 











PT  15.7 SEC (12.0-15.0)  H  05/12/17  04:39    


 


INR  1.25  (0.83-1.16)  H  05/12/17  04:39    














- Physical Exam


Constitutional: no apparent distress


Eyes: PERRL


Ears, Nose, Mouth, Throat: moist mucous membranes


Cardiovascular: regular rate and rhythym


Respiratory: no respiratory distress


Gastrointestinal: normoactive bowel sounds, soft, non-tender abdomen


Genitourinary: no bladder fullness


Skin: warm


Musculoskeletal: full muscle strength


Neurologic: AAOx3, CN II-XII Intact





ICD10 Worksheet


Patient Problems: 


 Problems











Problem Status Onset


 


Abdominal pain Acute  


 


Nausea Acute

## 2017-08-20 ENCOUNTER — HOSPITAL ENCOUNTER (INPATIENT)
Dept: HOSPITAL 80 - FED | Age: 62
LOS: 3 days | Discharge: HOME | DRG: 392 | End: 2017-08-23
Attending: INTERNAL MEDICINE | Admitting: INTERNAL MEDICINE
Payer: COMMERCIAL

## 2017-08-20 DIAGNOSIS — I25.10: ICD-10-CM

## 2017-08-20 DIAGNOSIS — E87.2: ICD-10-CM

## 2017-08-20 DIAGNOSIS — Z86.73: ICD-10-CM

## 2017-08-20 DIAGNOSIS — Z95.5: ICD-10-CM

## 2017-08-20 DIAGNOSIS — I48.0: ICD-10-CM

## 2017-08-20 DIAGNOSIS — R31.9: ICD-10-CM

## 2017-08-20 DIAGNOSIS — Z87.442: ICD-10-CM

## 2017-08-20 DIAGNOSIS — G43.909: ICD-10-CM

## 2017-08-20 DIAGNOSIS — Z87.891: ICD-10-CM

## 2017-08-20 DIAGNOSIS — Q63.1: ICD-10-CM

## 2017-08-20 DIAGNOSIS — R11.2: Primary | ICD-10-CM

## 2017-08-20 LAB
% IMMATURE GRANULYOCYTES: 0.2 % (ref 0–1.1)
ABSOLUTE IMMATURE GRANULOCYTES: 0.02 10^3/UL (ref 0–0.1)
ABSOLUTE NRBC COUNT: 0 10^3/UL (ref 0–0.01)
ADD DIFF?: NO
ADD MORPH?: NO
ADD SCAN?: NO
ALBUMIN SERPL-MCNC: 4.4 G/DL (ref 3.5–5)
ALP SERPL-CCNC: 76 IU/L (ref 38–126)
ALT SERPL-CCNC: 47 IU/L (ref 21–72)
ANION GAP SERPL CALC-SCNC: 14 MEQ/L (ref 8–16)
AST SERPL-CCNC: 29 IU/L (ref 17–59)
ATYPICAL LYMPHOCYTE FLAG: 0 (ref 0–99)
BILIRUB SERPL-MCNC: 1.3 MG/DL (ref 0.1–1.4)
BILIRUBIN-CONJUGATED: 0.3 MG/DL (ref 0–0.5)
BILIRUBIN-UNCONJUGATED: 1 MG/DL (ref 0–1.1)
CALCIUM SERPL-MCNC: 10.5 MG/DL (ref 8.5–10.4)
CHLORIDE SERPL-SCNC: 106 MEQ/L (ref 97–110)
CO2 SERPL-SCNC: 20 MEQ/L (ref 22–31)
CREAT SERPL-MCNC: 1.1 MG/DL (ref 0.7–1.3)
ERYTHROCYTE [DISTWIDTH] IN BLOOD BY AUTOMATED COUNT: 12.3 % (ref 11.5–15.2)
FRAGMENT RBC FLAG: 0 (ref 0–99)
GFR SERPL CREATININE-BSD FRML MDRD: > 60 ML/MIN/{1.73_M2}
GLUCOSE SERPL-MCNC: 109 MG/DL (ref 70–100)
HCT VFR BLD CALC: 45.5 % (ref 40–51)
HGB BLD-MCNC: 15.8 G/DL (ref 13.7–17.5)
LEFT SHIFT FLG: 0 (ref 0–99)
LIPEMIA HEMOLYSIS FLAG: 90 (ref 0–99)
MCH RBC BLDCO QN: 31.8 PG (ref 27.9–34.1)
MCHC RBC AUTO-ENTMCNC: 34.7 G/DL (ref 32.4–36.7)
MCV RBC AUTO: 91.5 FL (ref 81.5–99.8)
NRBC-AUTO%: 0 % (ref 0–0.2)
PLATELET # BLD: 241 10^3/UL (ref 150–400)
PLATELET CLUMPS FLAG: 0 (ref 0–99)
PMV BLD AUTO: 9.4 FL (ref 8.7–11.7)
POTASSIUM SERPL-SCNC: 4.4 MEQ/L (ref 3.5–5.2)
PROT SERPL-MCNC: 7.5 G/DL (ref 6.3–8.2)
RBC # BLD AUTO: 4.97 10^6/UL (ref 4.4–6.38)
SODIUM SERPL-SCNC: 140 MEQ/L (ref 134–144)
TROPONIN I SERPL-MCNC: < 0.012 NG/ML (ref 0–0.03)
TROPONIN I SERPL-MCNC: < 0.012 NG/ML (ref 0–0.03)

## 2017-08-20 PROCEDURE — G0472 HEP C SCREEN HIGH RISK/OTHER: HCPCS

## 2017-08-20 PROCEDURE — C1769 GUIDE WIRE: HCPCS

## 2017-08-20 RX ADMIN — PANTOPRAZOLE SODIUM SCH: 40 TABLET, DELAYED RELEASE ORAL at 22:38

## 2017-08-20 RX ADMIN — APIXABAN SCH: 5 TABLET, FILM COATED ORAL at 22:38

## 2017-08-20 RX ADMIN — ONDANSETRON PRN MG: 2 SOLUTION INTRAMUSCULAR; INTRAVENOUS at 20:12

## 2017-08-20 NOTE — EDPHY
HPI/HX/ROS/PE/MDM


Narrative: 





CHIEF COMPLAINT: Nausea, vomiting





HISTORY OF PRESENT ILLNESS: The patient is a 62-year-old male with history of 

CAD with stents and atrial fibrillation who presents with nausea that started 

at 12pm today. The patient has been seen for bouts of nausea twice, once in 

March and the second time in May.  In May he was admitted and failed his Stress 

test. He subsequently had a cardiac catheterization with stenting placed. 

Patient had no complaints of chest pain at that time.  Since May the patient 

has experienced 4 episodes of nausea, three episodes this past week. Each 

episode this week was precipitated by activity. The patient has vomited 4 times 

today. He states he has dark brown urine associated with the nausea. The 

patient is anticoagulated with dual anti-platelet therapy. He denies chest pain

, shortness of breath, or palpitations.  He does report some abdominal 

discomfort.


Patient did have an outpatient stress upper GI performed after his 1st 

hospitalization for nausea and had a gastric emptying study performed in May, 

both of which were largely unremarkable.





REVIEW OF SYSTEMS:


Aside from elements discussed in the HPI, a comprehensive 10-point review of 

systems was reviewed and is negative.





PAST MEDICAL HISTORY: CAD with stents, Atrial fibrillation, Migraines, 

Occipital lacunar infarct, Esophagitis





SOCIAL HISTORY: . Plays golf. 





VITAL SIGNS:  Reviewed by me


GENERAL:  Ill-appearing male, diaphoretic, ashen, and vomiting. 


HEENT:  Atraumatic.  Eyes:  No icterus, no injection.  Mouth:  moist mucous 

membranes.  No erythema or lesions.  Neck:  supple with no adenopathy.


LUNGS:  Clear to auscultation bilaterally, no wheezes, rhonchi or rales.


CARDIAC:  Regular rate and rhythm, no rubs, murmurs or gallops.


ABDOMEN:  Soft, no palpable tenderness, nondistended, bowel sounds normal.


BACK:  No CVA tenderness.


EXTREMITIES:  No trauma.  No edema.  Range of motion is normal throughout.


NEURO:  Alert and oriented,  grossly nonfocal.


SKIN:  Warm and dry, no rash.


PSYCHIATRIC:  Normal mentation, no agitation.





Portions of this note were transcribed by a medical scribe.  I personally 

performed a history, physical exam, medical decision making, and confirmed 

accuracy of information the transcribed note.


ED Course: 





Patient with cardiac history presents with multiple episodes of nausea this 

week.  Patient has had extensive workup for his nausea on 2 prior admissions to 

the hospital.  In May 2017 the patient was admitted with nausea and 

subsequently had cardiac stents placed. He had no associated chest pain prior 

to admission. 





The patient vomited while in the ED. I am concerned about patient presenting 

with the same cardiac symptoms as prior to stent placement. 12-LEAD EKG:  

Normal sinus rhythm with no acute ischemic changes.  Please see the full report 

in [facility].  





Laboratory evaluation including troponin, lipase, liver function tests are all 

largely unremarkable. Chest x-ray pending at the time of this dictation.





Patient continues to report nausea.  He states that Ativan is the only 

medication which has been able to control his severe nausea.  Patient received 

a mg of Ativan.





Patient will be admitted to the hospital, Dr. Persaud service.  Course was 

discussed with Dr. Persaud.  Patient will be admitted to the PCU.


MDM: 


Differential diagnosis for the patient's symptom complex was considered 

including but not limited to chronic nausea, pancreatitis, pancreatic carcinoma

, gastritis, reflux, acute coronary syndrome, anginal equivalent, neurologic 

source, infectious source.








- Data Points


Laboratory Results: 


 Laboratory Results





 08/20/17 16:43 





 08/20/17 16:43 





 











  08/20/17 08/20/17





  16:43 16:43


 


WBC    9.94 10^3/uL H 10^3/uL





    (3.80-9.50) 


 


RBC    4.97 10^6/uL 10^6/uL





    (4.40-6.38) 


 


Hgb    15.8 g/dL g/dL





    (13.7-17.5) 


 


Hct    45.5 % %





    (40.0-51.0) 


 


MCV    91.5 fL fL





    (81.5-99.8) 


 


MCH    31.8 pg pg





    (27.9-34.1) 


 


MCHC    34.7 g/dL g/dL





    (32.4-36.7) 


 


RDW    12.3 % %





    (11.5-15.2) 


 


Plt Count    241 10^3/uL 10^3/uL





    (150-400) 


 


MPV    9.4 fL fL





    (8.7-11.7) 


 


Neut % (Auto)    76.6 % H %





    (39.3-74.2) 


 


Lymph % (Auto)    16.4 % %





    (15.0-45.0) 


 


Mono % (Auto)    5.7 % %





    (4.5-13.0) 


 


Eos % (Auto)    0.7 % %





    (0.6-7.6) 


 


Baso % (Auto)    0.4 % %





    (0.3-1.7) 


 


Nucleat RBC Rel Count    0.0 % %





    (0.0-0.2) 


 


Absolute Neuts (auto)    7.61 10^3/uL H 10^3/uL





    (1.70-6.50) 


 


Absolute Lymphs (auto)    1.63 10^3/uL 10^3/uL





    (1.00-3.00) 


 


Absolute Monos (auto)    0.57 10^3/uL 10^3/uL





    (0.30-0.80) 


 


Absolute Eos (auto)    0.07 10^3/uL 10^3/uL





    (0.03-0.40) 


 


Absolute Basos (auto)    0.04 10^3/uL 10^3/uL





    (0.02-0.10) 


 


Absolute Nucleated RBC    0.00 10^3/uL 10^3/uL





    (0-0.01) 


 


Immature Gran %    0.2 % %





    (0.0-1.1) 


 


Immature Gran #    0.02 10^3/uL 10^3/uL





    (0.00-0.10) 


 


Sodium  140 mEq/L mEq/L  





   (134-144)  


 


Potassium  4.4 mEq/L mEq/L  





   (3.5-5.2)  


 


Chloride  106 mEq/L mEq/L  





   ()  


 


Carbon Dioxide  20 mEq/l L mEq/l  





   (22-31)  


 


Anion Gap  14 mEq/L mEq/L  





   (8-16)  


 


BUN  25 mg/dL H mg/dL  





   (7-23)  


 


Creatinine  1.1 mg/dL mg/dL  





   (0.7-1.3)  


 


Estimated GFR  > 60   





   


 


Glucose  109 mg/dL H mg/dL  





   ()  


 


Calcium  10.5 mg/dL H mg/dL  





   (8.5-10.4)  


 


Total Bilirubin  1.3 mg/dL mg/dL  





   (0.1-1.4)  


 


Conjugated Bilirubin  0.3 mg/dL mg/dL  





   (0.0-0.5)  


 


Unconjugated Bilirubin  1.0 mg/dL mg/dL  





   (0.0-1.1)  


 


AST  29 IU/L IU/L  





   (17-59)  


 


ALT  47 IU/L IU/L  





   (21-72)  


 


Alkaline Phosphatase  76 IU/L IU/L  





   ()  


 


Troponin I  < 0.012 ng/mL ng/mL  





   (0.000-0.034)  


 


Total Protein  7.5 g/dL g/dL  





   (6.3-8.2)  


 


Albumin  4.4 g/dL g/dL  





   (3.5-5.0)  


 


Lipase  81 IU/L IU/L  





   ()  











Medications Given: 


 








Discontinued Medications





Sodium Chloride (Ns)  1,000 mls @ 0 mls/hr IV ONCE ONE


   PRN Reason: Wide Open


   Stop: 08/20/17 16:43


   Last Admin: 08/20/17 16:43 Dose:  1,000 mls


Lorazepam (Ativan Injection)  1 mg IVP EDNOW ONE


   Stop: 08/20/17 17:01


   Last Admin: 08/20/17 17:06 Dose:  1 mg


Ondansetron HCl (Zofran)  4 mg IVP EDNOW ONE


   Stop: 08/20/17 16:43


   Last Admin: 08/20/17 16:43 Dose:  4 mg








General


Time Seen by Provider: 08/20/17 16:34


Initial Vital Signs: 


 Initial Vital Signs











Temperature (C)  36.9 C   08/20/17 16:23


 


Heart Rate  56 L  08/20/17 16:23


 


Respiratory Rate  18   08/20/17 16:23


 


Blood Pressure  158/87 H  08/20/17 16:23


 


O2 Sat (%)  99   08/20/17 16:23








 











O2 Delivery Mode               Room Air














Allergies/Adverse Reactions: 


 





No Known Allergies Allergy (Verified 05/09/17 11:54)


 








Home Medications: 














 Medication  Instructions  Recorded


 


Ondansetron Odt [Zofran Odt 4 mg 4 mg PO Q4HRS PRN 05/09/17





(*)]  


 


Promethazine HCl [Phenergan 25mg 25 mg PO Q6HRS PRN 05/09/17





(*)]  


 


Apixaban [Eliquis] 5 mg PO BID #60 tab 05/15/17


 


Aspirin EC [Aspirin EC 81 mg (*)] 81 mg PO DAILY #30 tab 05/15/17


 


Atorvastatin Calcium [Lipitor 40 40 mg PO DAILY #30 tab 05/15/17





mg (*)]  


 


Carvedilol [Coreg (*)] 1.56 mg PO BIDMEAL #30 tab 05/15/17


 


Famotidine [Pepcid 20 MG (*)] 20 mg PO DAILY #30 tab 05/15/17


 


Nitroglycerin [Nitrostat 0.4 mg 0.4 mg SL PRN PRN #30 btl 05/15/17





(*)]  


 


Pantoprazole Sodium [Protonix 40mg 40 mg PO BID #60 tab 05/15/17





(*)]  


 


Prasugrel HCl [Effient 10mg (*)] 10 mg PO DAILY #30 tab 05/15/17














Departure





- Departure


[facility]


Clinical Impression: 


 rule out acute coronary syndrome





Nausea & vomiting


Qualifiers:


 Vomiting type: unspecified Vomiting Intractability: non-intractable Qualified 

Code(s): R11.2 - Nausea with vomiting, unspecified





Condition: Fair

## 2017-08-20 NOTE — GHP
[f rep st]



                                                            HISTORY AND PHYSICAL





DATE OF ADMISSION:  08/20/2017



HISTORY OF PRESENT ILLNESS:  The patient is a 62-year-old gentleman with a history of coronary arter
y disease with the anginal equivalent of severe nausea who presents with severe nausea. 



He was admitted here in March and underwent a nausea workup that was relatively unremarkable.  The n
ausea sort of resolved.  He was seen here again in May at which point he had a stress test, which wa
s positive.  He underwent stenting in the RCA and LAD and then a subsequent stent placed in his LAD.
  He has done well since then.  He was on a golf vacation in [place] this week.  He played 
36-holes, 2-days in a row with a cart and developed nausea following each of those evenings.  Today,
 he played golf with his son in the heat.  He played 18-holes he did walk.  He was able to complete 
his round of golf.  He said that he played quite well, but he had some nausea and he continues to ha
ve nausea.  



According to the ER doctor with whom I spoke, the patient was ashen and felt poorly when he got here
. 



He has no exertional dyspnea.  He was diaphoretic with the nausea, but otherwise not having chest pa
in, arm pain, jaw pain.  It sounds like he does not do much in the way of exercise other than golf a
nd walking, but he is not having exertional anginal-type symptoms.  He has not had any followup for 
his stomach since then.  His nausea has remained largely resolved since May.  Currently, he is feeli
ng better from when he arrived with Ativan helping for his nausea, but otherwise still continues to 
feel poorly.



REVIEW OF SYSTEMS:  Complete 10-point Review of Systems conducted and negative except as noted in th
e HPI.



PAST MEDICAL HISTORY:  

1.  Coronary disease as described in the HPI.  

2.  Nausea felt secondary to anginal equivalent.

3.  Esophagitis.

4.  Knee surgeries.



FAMILY HISTORY:  Mother had lupus, father had colon cancer.



ALLERGIES:  No known drug allergies.



MEDICATIONS:  

1.  Eliquis.

2.  Atorvastatin.

3.  Carvedilol.

4.  Clopidogrel. 

5.  Famotidine.

6.  Pantoprazole.  



He has been compliant with his antiplatelet therapy.



SOCIAL HISTORY:  Retired from computer sales, lives in [place], nonsmoker, drinks a couple drinks of
 alcohol per day.



PHYSICAL EXAMINATION:  VITAL SIGNS:  Temp 36.9, blood pressure 158/87, pulse 56, breathing 18 times 
a minute, 99% on room air.  HEENT:  Sclerae anicteric.  Oropharynx clear.  Mucous membranes are mois
t.  NECK:  Supple without lymphadenopathy or JVD.  LUNGS:  Clear to auscultation bilaterally.  HEART
:  S1 and S2 without murmurs.  ABDOMEN:  Soft, nontender, nondistended.  LOWER EXTREMITIES:  Without
 edema.  Calves are nontender.  Skin without rash.  NEUROLOGIC EXAM:  Nonfocal.  GENERAL:  Patient a
ppears uncomfortable, nauseated.



LABS:  White count 9.9, hematocrit 45, platelets are 241,000.  Sodium 140, potassium 4.4, chloride 1
06, bicarb 20, BUN 25, creatinine 1.1.  His baseline appears to be about 1.1.  Glucose 109, calcium 
10.5, LFTs normal.  Troponin less than 0 0.012.  



EKG interpreted by me, shows sinus at 61 with normal axis and intervals.  There are no ST or T-wave 
changes.  It is similar to other EKGs from his past.  I have compared and discussed the case with Dr Sal Mccurdy.



ASSESSMENT AND PLAN:  A 62-year-old gentleman with coronary disease and nausea as an anginal equival
ent, presents with nausea.

1.  Nausea.  This is concerning for recurrent angina.  He has a nonischemic EKG and negative troponi
n.  Now, he has had symptoms on-and-off for a couple of days.  I am in the process of trying to get 
in touch with Cardiology to see him regarding possibly proceeding straight to angiogram.  I will cyc
le his troponins.  Given his elevated blood pressure and anginal symptoms, I will put him on a nitro
glycerin drip.  I will continue his medications.  I think that given his novel anticoagulant therapy
, there is no role for heparin especially in light of negative troponins and nonischemic EKG.

2.  Nausea, this is, I believe his anginal equivalent.  Will give him some p.r.n. Ativan.  

3.  Hypertension, continue his carvedilol.

4.  Atrial fibrillation, he is anticoagulated clinically in sinus. 

5.  Elevated BUN.  Patient clinically probably a bit dragging 1 L of fluid.  He is not in heart fail
ure.



DISPOSITION:  Inpatient status.





Job #:  [id number]

## 2017-08-20 NOTE — CPEKG
Heart Rate: 61

RR Interval: 984

P-R Interval: 164

QRSD Interval: 106

QT Interval: 460

QTC Interval: 464

P Axis: 70

QRS Axis: 63

T Wave Axis: 51

EKG Severity - NORMAL ECG -

EKG Impression: SINUS RHYTHM

Electronically Signed By: Bin Santos 21-Aug-2017 16:12:45

## 2017-08-21 LAB
% IMMATURE GRANULYOCYTES: 0.4 % (ref 0–1.1)
ABSOLUTE IMMATURE GRANULOCYTES: 0.03 10^3/UL (ref 0–0.1)
ABSOLUTE NRBC COUNT: 0 10^3/UL (ref 0–0.01)
ADD DIFF?: NO
ADD MORPH?: NO
ADD SCAN?: NO
ANION GAP SERPL CALC-SCNC: 13 MEQ/L (ref 8–16)
ATYPICAL LYMPHOCYTE FLAG: 10 (ref 0–99)
CALCIUM SERPL-MCNC: 9.4 MG/DL (ref 8.5–10.4)
CHLORIDE SERPL-SCNC: 108 MEQ/L (ref 97–110)
CO2 SERPL-SCNC: 19 MEQ/L (ref 22–31)
COLOR UR: YELLOW
CREAT SERPL-MCNC: 0.9 MG/DL (ref 0.7–1.3)
ERYTHROCYTE [DISTWIDTH] IN BLOOD BY AUTOMATED COUNT: 12.2 % (ref 11.5–15.2)
FRAGMENT RBC FLAG: 0 (ref 0–99)
GFR SERPL CREATININE-BSD FRML MDRD: > 60 ML/MIN/{1.73_M2}
GLUCOSE SERPL-MCNC: 120 MG/DL (ref 70–100)
HCT VFR BLD CALC: 42.1 % (ref 40–51)
HGB BLD-MCNC: 13.9 G/DL (ref 13.7–17.5)
LEFT SHIFT FLG: 0 (ref 0–99)
LIPEMIA HEMOLYSIS FLAG: 80 (ref 0–99)
MCH RBC BLDCO QN: 31.5 PG (ref 27.9–34.1)
MCHC RBC AUTO-ENTMCNC: 33 G/DL (ref 32.4–36.7)
MCV RBC AUTO: 95.5 FL (ref 81.5–99.8)
MUCOUS THREADS #/AREA URNS LPF: (no result) /LPF
NITRITE UR QL STRIP: NEGATIVE
NRBC-AUTO%: 0 % (ref 0–0.2)
PH UR STRIP: 5 [PH] (ref 5–7.5)
PLATELET # BLD: 212 10^3/UL (ref 150–400)
PLATELET CLUMPS FLAG: 0 (ref 0–99)
PMV BLD AUTO: 9.5 FL (ref 8.7–11.7)
POTASSIUM SERPL-SCNC: 4.6 MEQ/L (ref 3.5–5.2)
RBC # BLD AUTO: 4.41 10^6/UL (ref 4.4–6.38)
RBC #/AREA URNS HPF: (no result) /HPF (ref 0–3)
SODIUM SERPL-SCNC: 140 MEQ/L (ref 134–144)
SP GR UR STRIP: 1.02 (ref 1–1.03)
TROPONIN I SERPL-MCNC: < 0.012 NG/ML (ref 0–0.03)
WBC #/AREA URNS HPF: (no result) /HPF (ref 0–3)

## 2017-08-21 RX ADMIN — PANTOPRAZOLE SODIUM SCH MG: 40 TABLET, DELAYED RELEASE ORAL at 21:37

## 2017-08-21 RX ADMIN — APIXABAN SCH: 5 TABLET, FILM COATED ORAL at 11:52

## 2017-08-21 RX ADMIN — ATORVASTATIN CALCIUM SCH MG: 40 TABLET, FILM COATED ORAL at 12:18

## 2017-08-21 RX ADMIN — CLOPIDOGREL BISULFATE SCH MG: 75 TABLET, FILM COATED ORAL at 12:18

## 2017-08-21 RX ADMIN — ONDANSETRON PRN MG: 2 SOLUTION INTRAMUSCULAR; INTRAVENOUS at 11:00

## 2017-08-21 RX ADMIN — CARVEDILOL SCH MG: 3.12 TABLET, FILM COATED ORAL at 18:48

## 2017-08-21 RX ADMIN — CARVEDILOL SCH MG: 3.12 TABLET, FILM COATED ORAL at 12:18

## 2017-08-21 RX ADMIN — PANTOPRAZOLE SODIUM SCH MG: 40 TABLET, DELAYED RELEASE ORAL at 12:18

## 2017-08-21 RX ADMIN — FAMOTIDINE SCH MG: 20 TABLET, FILM COATED ORAL at 12:18

## 2017-08-21 NOTE — HOSPPROG
Hospitalist Progress Note


Assessment/Plan: 


Assessment:  62-year-old male presents with acute on chronic nausea and vomiting





Plan:





1. Nausea and vomiting.  Acute, new problem this provider, further workup 

indicated.  Possible etiologies include unstable angina versus acute 

intermittent porphyria versus irritable bowel syndrome versus ischemic colitis.

  It is difficult to say which is the most likely etiology at this time, given 

the patient's underlying coronary artery disease, and notably dark urine.


-send urinalysis, urine porphyrins, plasma porphyrin, 24 hour collection sample


-get CT angio of the abdomen


-discussed with Dr. Moffett, we both agree that cardiac catheterization would be 

of high value, either excluding angina as primary cause, or confirming and 

rendering interval solution


-continue supportive care with antiemetics


-continue IV fluids


-advance diet to clear this afternoon, NPO after midnight





2. Coronary artery disease.  Chronic, patient status post previous stent, EKG 

demonstrating no evidence of ischemia, personally interpreted comma chest x-ray 

demonstrating no evidence of pulmonary edema, personally interpreted


-continue home medications including statin Plavix, beta-blocker





3. Paroxysmal atrial fibrillation.  Patient had 1 episode of provoked atrial 

fibrillation following adenosine stress test last hospitalization, was placed 

on systemic anticoagulation for possible TIA


-will hold Eliquis, in order to ensure safe cardiac catheterization tomorrow


-continue beta-blocker





Diet.  Clear liquids, NPO after midnight





Prophylaxis.  Low risk patient, SCDs





Code.  Full





Disposition.  Anticipated discharge is 8/22, following cardiac catheterization, 

depending on intervention status.





High medical complexity, high risk of worsening morbidity and/or mortality, 

given the issues as outlined above.





Subjective: Patient has ongoing nausea, last bowel movement yesterday


Objective: 


 Vital Signs











Temp Pulse Resp BP Pulse Ox


 


 37.0 C   66   18   119/68   99 


 


 08/21/17 11:24  08/21/17 11:24  08/21/17 11:24  08/21/17 11:24  08/21/17 11:24








 Laboratory Results





 08/21/17 03:49 





 08/21/17 03:49 





 











 08/20/17 08/21/17 08/22/17





 05:59 05:59 05:59


 


Intake Total  1000 


 


Output Total  300 


 


Balance  700 














- Physical Exam


Constitutional: appears nourished, not in pain, uncomfortable, No no apparent 

distress (Moderate distress from his nausea)


Cardiovascular: regular rate and rhythym, no murmur, rub, or gallop, No 

irregularly irregular, No edema


Respiratory: no respiratory distress, no rales or rhonchi, clear to auscultation


Gastrointestinal: normoactive bowel sounds, soft, non-tender abdomen, no 

palpable masses, No guarding, No distension


Genitourinary: no bladder fullness, no bladder tenderness, no renal bruits


Neurologic: AAOx3, sensation intact bilaterally, No weakness


Psychiatric: not anxious, not encephalopathic, flat affect, No agitated





ICD10 Worksheet


Patient Problems: 


 Problems











Problem Status Onset


 


Abdominal pain Acute  


 


Nausea Acute  


 


Nausea & vomiting Acute

## 2017-08-21 NOTE — PDCONSULT
Consultant Note: 


Assessment/Plan:





Hematuria: chronic and persistent, gross hematuria intermittently and mostly 

associated with exercise but seems like he may have chronic microscopic 

hematuria as well, every UA since 2013 with hematuria to some degree.  His 

urine is already starting to clear up.  He states that he had urological workup 

done within the year, need to obtain records.  He could have hematuria from 

ruptured cyst, does not seem to have an obstructing stone on CT scan nor any 

other symptoms to suggest that.  He may have some other glomerular disease, 

such as IgA nephropathy.


 - Would recommend obtaining outpatient Urology records.  If his hematuria does 

not clear up, may need inpatient urology consult but will hold off for now.


 - Will send full serological workup at this time.


 - Will continue to monitor.





Proteinuria: has been present on UAs since 2/2017, unclear etiology, in setting 

of hematuria as above.


 - Will send urine PCR.


 - Will send serological workup as above.








Thank you for the interesting consult.  Nephrology will continue to follow, 

please call if you have any additional questions or concerns.





H & P


Stated Complaint: Nausea, vomitng, belching;similiar to previous card event 5/ 2017


Time Seen by Provider: 08/20/17 16:34


HPI/ROS: 





Mr. Lucero is a 63 yo M with h/o horseshoe kidney, kidney and liver cysts, 

and recurrent episodes of N/V who presents with again N/V.  Pt states that he 

has been having intermittent issues with N/V that are terrible for the past 

five months that have now caused three hospitalizations.  The first was in 3/

2017, improved shortly after admission and was discharged.  He was again 

hospitalized in May, negative GI workup, thought it might be anginal equivalent 

and had cardiac cath done, found to have 2v disease and was stented.  He notes 

that initially he seemed better but has had 5 episodes of N/V since then, three 

were within the last week.  Pt states that he also has problems with dark 

colored urine intermittently.  He mostly notes that these episodes happen after 

bouts of exercise but not after every time he does every exercise.  His dark 

urine is not always associated with N/V, but whenever he gets N/V he also gets 

the dark urine.  He is noted to have gross hematuria this time, seems like he 

has been having hematuria on every UA since 2013, and every UA this year has 

had some proteinuria.  His Cr is at baseline at 0.9.  Pt states that he has 

seen urology within the past year for hematuria noted by his PCP, and that 

workup was negative.  He has had stone problems since 2015 and notes that he 

still has bilateral stones.  However, these episodes feel different than when 

he passed the stone, not nearly as painful but he can occasionally get some 

flank pain.





- Personal History


Current Tetanus Diphtheria and Acellular Pertussis (TDAP): Yes


Tetanus Vaccine Date: > 10





- Medical/Surgical History


Hx Asthma: No


Hx Chronic Respiratory Disease: No


Hx Diabetes: No


Hx Cardiac Disease: Yes


Hx Renal Disease: No


Hx Cirrhosis: No


Hx Alcoholism: No


Hx HIV/AIDS: No


Hx Splenectomy or Spleen Trauma: No


Other PMH: kidney cystLEFT KNEE SURGERY.  GERD.  recurrent n/v unknown 

etiology.  card stent





- Family History


Significant Family History: Other (Mother with SLE, multiple family members 

with kidney stones, no other renal disease)





- Social History


Smoking Status: Former smoker





- Physical Exam


Exam: 





General: alert and oriented, no acute distress


Eyes; EOMI, PERRL


OP: clear, MMM


Neck: supple, no thyromegaly


CV: RRR, 2/4 radial and dorsalis pedis pulses, no peripheral edema


Resp: CTA bilat, nonlabored respirations on RA


Abd: Soft, NT/ND


Neuro: CN II-XII grossly intact, no asterixis


Psych: cooperative, appropriate mood and affect


Skin: C/D/I, no rash


MSK: no gross deformities, no synovitis


Constitutional: 


 Initial Vital Signs











Temperature (C)  36.9 C   08/20/17 16:23


 


Heart Rate  56 L  08/20/17 16:23


 


Respiratory Rate  18   08/20/17 16:23


 


Blood Pressure  158/87 H  08/20/17 16:23


 


O2 Sat (%)  99   08/20/17 16:23








 











O2 Delivery Mode               Room Air














Allergies/Adverse Reactions: 


 





No Known Allergies Allergy (Verified 05/09/17 11:54)


 








Home Medications: 














 Medication  Instructions  Recorded


 


Apixaban [Eliquis] 5 mg PO BID #60 tab 05/15/17


 


Atorvastatin Calcium [Lipitor 40 40 mg PO DAILY #30 tab 05/15/17





mg (*)]  


 


Famotidine [Pepcid 20 MG (*)] 20 mg PO DAILY #30 tab 05/15/17


 


Pantoprazole Sodium [Protonix 40mg 40 mg PO BID #60 tab 05/15/17





(*)]  


 


Carvedilol [Coreg (*)] 3.125 mg PO BIDMEAL 08/20/17


 


Clopidogrel Bisulfate [Clopidogrel] 75 mg pe PO DAILY 08/20/17














Lab and Imaging





 08/21/17 03:49





 08/21/17 03:49














WBC  8.27 10^3/uL (3.80-9.50)   08/21/17  03:49    


 


RBC  4.41 10^6/uL (4.40-6.38)   08/21/17  03:49    


 


Hgb  13.9 g/dL (13.7-17.5)   08/21/17  03:49    


 


Hct  42.1 % (40.0-51.0)   08/21/17  03:49    


 


MCV  95.5 fL (81.5-99.8)   08/21/17  03:49    


 


MCH  31.5 pg (27.9-34.1)   08/21/17  03:49    


 


MCHC  33.0 g/dL (32.4-36.7)   08/21/17  03:49    


 


RDW  12.2 % (11.5-15.2)   08/21/17  03:49    


 


Plt Count  212 10^3/uL (150-400)   08/21/17  03:49    


 


MPV  9.5 fL (8.7-11.7)   08/21/17  03:49    


 


Neut % (Auto)  86.1 % (39.3-74.2)  H  08/21/17  03:49    


 


Lymph % (Auto)  9.4 % (15.0-45.0)  L  08/21/17  03:49    


 


Mono % (Auto)  4.0 % (4.5-13.0)  L  08/21/17  03:49    


 


Eos % (Auto)  0.0 % (0.6-7.6)  L  08/21/17  03:49    


 


Baso % (Auto)  0.1 % (0.3-1.7)  L  08/21/17  03:49    


 


Nucleat RBC Rel Count  0.0 % (0.0-0.2)   08/21/17  03:49    


 


Absolute Neuts (auto)  7.12 10^3/uL (1.70-6.50)  H  08/21/17  03:49    


 


Absolute Lymphs (auto)  0.78 10^3/uL (1.00-3.00)  L  08/21/17  03:49    


 


Absolute Monos (auto)  0.33 10^3/uL (0.30-0.80)   08/21/17  03:49    


 


Absolute Eos (auto)  0.00 10^3/uL (0.03-0.40)  L  08/21/17  03:49    


 


Absolute Basos (auto)  0.01 10^3/uL (0.02-0.10)  L  08/21/17  03:49    


 


Absolute Nucleated RBC  0.00 10^3/uL (0-0.01)   08/21/17  03:49    


 


Immature Gran %  0.4 % (0.0-1.1)   08/21/17  03:49    


 


Immature Gran #  0.03 10^3/uL (0.00-0.10)   08/21/17  03:49    


 


Sodium  140 mEq/L (134-144)   08/21/17  03:49    


 


Potassium  4.6 mEq/L (3.5-5.2)   08/21/17  03:49    


 


Chloride  108 mEq/L ()   08/21/17  03:49    


 


Carbon Dioxide  19 mEq/l (22-31)  L  08/21/17  03:49    


 


Anion Gap  13 mEq/L (8-16)   08/21/17  03:49    


 


BUN  27 mg/dL (7-23)  H  08/21/17  03:49    


 


Creatinine  0.9 mg/dL (0.7-1.3)   08/21/17  03:49    


 


Estimated GFR  > 60   08/21/17  03:49    


 


Glucose  120 mg/dL ()  H  08/21/17  03:49    


 


Calcium  9.4 mg/dL (8.5-10.4)   08/21/17  03:49    


 


Total Bilirubin  1.3 mg/dL (0.1-1.4)   08/20/17  16:43    


 


Conjugated Bilirubin  0.3 mg/dL (0.0-0.5)   08/20/17  16:43    


 


Unconjugated Bilirubin  1.0 mg/dL (0.0-1.1)   08/20/17  16:43    


 


AST  29 IU/L (17-59)   08/20/17  16:43    


 


ALT  47 IU/L (21-72)   08/20/17  16:43    


 


Alkaline Phosphatase  76 IU/L ()   08/20/17  16:43    


 


Creatine Kinase  92 IU/L (0-224)   08/21/17  03:49    


 


Troponin I  < 0.012 ng/mL (0.000-0.034)   08/21/17  03:49    


 


NT-Pro-B Natriuret Pep  158 pg/mL (0-125)  H  08/20/17  20:25    


 


Total Protein  7.5 g/dL (6.3-8.2)   08/20/17  16:43    


 


Albumin  4.4 g/dL (3.5-5.0)   08/20/17  16:43    


 


Lipase  81 IU/L ()   08/20/17  16:43    


 


Urine Color  YELLOW   08/21/17  12:16    


 


Urine Appearance  HAZY   08/21/17  12:16    


 


Urine pH  5.0  (5.0-7.5)   08/21/17  12:16    


 


Ur Specific Gravity  1.024  (1.002-1.030)   08/21/17  12:16    


 


Urine Protein  2+  (NEGATIVE)  H  08/21/17  12:16    


 


Urine Ketones  1+  (NEGATIVE)  H  08/21/17  12:16    


 


Urine Blood  3+  (NEGATIVE)  H  08/21/17  12:16    


 


Urine Nitrate  NEGATIVE  (NEGATIVE)   08/21/17  12:16    


 


Urine Bilirubin  NEGATIVE  (NEGATIVE)   08/21/17  12:16    


 


Urine Urobilinogen  NEGATIVE EU (0.2-1.0)   08/21/17  12:16    


 


Ur Leukocyte Esterase  NEGATIVE  (NEGATIVE)   08/21/17  12:16    


 


Urine RBC   /hpf (0-3)  H  08/21/17  12:16    


 


Urine WBC  1-3 /hpf (0-3)   08/21/17  12:16    


 


Ur Epithelial Cells  TRACE /lpf (NONE-1+)   08/21/17  12:16    


 


Urine Mucus  TRACE /lpf (NONE-1+)   08/21/17  12:16    


 


Urine Glucose  1+  (NEGATIVE)  H  08/21/17  12:16

## 2017-08-22 LAB
% IMMATURE GRANULYOCYTES: 0.3 % (ref 0–1.1)
ABSOLUTE IMMATURE GRANULOCYTES: 0.02 10^3/UL (ref 0–0.1)
ABSOLUTE NRBC COUNT: 0 10^3/UL (ref 0–0.01)
ADD DIFF?: NO
ADD MORPH?: NO
ADD SCAN?: NO
ANION GAP SERPL CALC-SCNC: 8 MEQ/L (ref 8–16)
APTT BLD: 27.3 SEC (ref 23–38)
ATYPICAL LYMPHOCYTE FLAG: 10 (ref 0–99)
BACTERIA #/AREA URNS HPF: (no result) /HPF
CALCIUM SERPL-MCNC: 9 MG/DL (ref 8.5–10.4)
CHLORIDE SERPL-SCNC: 108 MEQ/L (ref 97–110)
CHOLEST SERPL-MCNC: 91 MG/DL (ref 140–220)
CHOLEST/HDLC SERPL: 2.28 RATIO (ref 1–4.97)
CO2 SERPL-SCNC: 23 MEQ/L (ref 22–31)
COLOR UR: (no result)
CREAT SERPL-MCNC: 1.1 MG/DL (ref 0.7–1.3)
ERYTHROCYTE [DISTWIDTH] IN BLOOD BY AUTOMATED COUNT: 12.5 % (ref 11.5–15.2)
FRAGMENT RBC FLAG: 0 (ref 0–99)
GFR SERPL CREATININE-BSD FRML MDRD: > 60 ML/MIN/{1.73_M2}
GLUCOSE SERPL-MCNC: 95 MG/DL (ref 70–100)
HCT VFR BLD CALC: 40 % (ref 40–51)
HGB BLD-MCNC: 13.4 G/DL (ref 13.7–17.5)
HIGH DENSITY LIPOPROTEIN: 40 MG/DL (ref 40–65)
IMP & REVIEW OF LAB RESULTS: (no result)
INR PPP: 1.22 (ref 0.83–1.16)
LDLC/HDLC SERPL: 1.1 RATIO (ref 1–3.64)
LEFT SHIFT FLG: 0 (ref 0–99)
LIPEMIA HEMOLYSIS FLAG: 80 (ref 0–99)
LOW DENSITY LIPOPROTEIN: 44 MG/DL (ref 80–100)
Lab: (no result)
MAGNESIUM SERPL-MCNC: 2.1 MG/DL (ref 1.6–2.3)
MCH RBC BLDCO QN: 32.1 PG (ref 27.9–34.1)
MCHC RBC AUTO-ENTMCNC: 33.5 G/DL (ref 32.4–36.7)
MCV RBC AUTO: 95.7 FL (ref 81.5–99.8)
MUCOUS THREADS #/AREA URNS LPF: (no result) /LPF
NITRITE UR QL STRIP: NEGATIVE
NON-HIGH DENSITY LIPOPROTEIN: 51 MG/DL (ref 90–129)
NRBC-AUTO%: 0 % (ref 0–0.2)
PH UR STRIP: 7 [PH] (ref 5–7.5)
PLATELET # BLD: 186 10^3/UL (ref 150–400)
PLATELET CLUMPS FLAG: 10 (ref 0–99)
PMV BLD AUTO: 9.5 FL (ref 8.7–11.7)
POTASSIUM SERPL-SCNC: 4.6 MEQ/L (ref 3.5–5.2)
PROTHROMBIN TIME: 15.4 SEC (ref 12–15)
RANDOM URINE PROTEIN: 14 MG/DL (ref 0–11)
RBC # BLD AUTO: 4.18 10^6/UL (ref 4.4–6.38)
RBC #/AREA URNS HPF: (no result) /HPF (ref 0–3)
SODIUM SERPL-SCNC: 139 MEQ/L (ref 134–144)
SP GR UR STRIP: 1.02 (ref 1–1.03)
TRIGL SERPL-MCNC: 37 MG/DL (ref 40–150)
VERY LOW DENSITY LIPOPROTEINS: 7 MG/DL (ref 8–25)
WBC #/AREA URNS HPF: (no result) /HPF (ref 0–3)

## 2017-08-22 PROCEDURE — B2151ZZ FLUOROSCOPY OF LEFT HEART USING LOW OSMOLAR CONTRAST: ICD-10-PCS | Performed by: INTERNAL MEDICINE

## 2017-08-22 PROCEDURE — 4A023N7 MEASUREMENT OF CARDIAC SAMPLING AND PRESSURE, LEFT HEART, PERCUTANEOUS APPROACH: ICD-10-PCS | Performed by: INTERNAL MEDICINE

## 2017-08-22 PROCEDURE — B2111ZZ FLUOROSCOPY OF MULTIPLE CORONARY ARTERIES USING LOW OSMOLAR CONTRAST: ICD-10-PCS | Performed by: INTERNAL MEDICINE

## 2017-08-22 RX ADMIN — FAMOTIDINE SCH MG: 20 TABLET, FILM COATED ORAL at 08:24

## 2017-08-22 RX ADMIN — PANTOPRAZOLE SODIUM SCH MG: 40 TABLET, DELAYED RELEASE ORAL at 08:23

## 2017-08-22 RX ADMIN — PROMETHAZINE HYDROCHLORIDE PRN MG: 25 INJECTION INTRAMUSCULAR; INTRAVENOUS at 23:10

## 2017-08-22 RX ADMIN — CARVEDILOL SCH MG: 3.12 TABLET, FILM COATED ORAL at 08:24

## 2017-08-22 RX ADMIN — ATORVASTATIN CALCIUM SCH MG: 40 TABLET, FILM COATED ORAL at 08:24

## 2017-08-22 RX ADMIN — CLOPIDOGREL BISULFATE SCH MG: 75 TABLET, FILM COATED ORAL at 08:23

## 2017-08-22 RX ADMIN — PROMETHAZINE HYDROCHLORIDE PRN MG: 25 INJECTION INTRAMUSCULAR; INTRAVENOUS at 17:49

## 2017-08-22 RX ADMIN — ONDANSETRON PRN MG: 2 SOLUTION INTRAMUSCULAR; INTRAVENOUS at 21:56

## 2017-08-22 NOTE — SOAPPROG
SOAP Progress Note


Assessment/Plan: 


Assessment:Plan:


Hematuria-periods of gross hematuria manifested by "coffee-colored"  urine


   -this likely represents IgA


   -had recent Urologic evaluation that was negative


   -serologies ordered


   -history not consistent with Paroxysmal Nocturnal Hemoglobinuria


   -no history to suggest post or co-infectious GN


   -symptoms not consistent with bleeding associated with passage of stone(s) 

although patient with history of nephrolithiasis


   -will follow


   -empiric use of fish oil is likely reasonable


   -renal biopsy could be performed in the future, but with current antiplatelet

/anticoagulant therapy the risk would outweigh the benefit


   -will need office follow up with Dr. Whalen 7-10days after discharge





08/22/17 10:26





Subjective: 





intermittent nausea, awaiting [facility]


Objective: 





 Vital Signs











Temp Pulse Resp BP Pulse Ox


 


 36.8 C   102 H  18   100/86 H  92 


 


 08/22/17 07:56  08/22/17 07:56  08/22/17 07:56  08/22/17 07:56  08/22/17 07:56








 Laboratory Results





 08/22/17 04:43 





 08/22/17 04:43 





 











 08/21/17 08/22/17 08/23/17





 05:59 05:59 05:59


 


Intake Total 1000 2740 


 


Output Total 300 0 


 


Balance 700 2740 








 











PT  15.4 SEC (12.0-15.0)  H  08/22/17  04:43    


 


INR  1.22  (0.83-1.16)  H  08/22/17  04:43    














Physical Exam





- Physical Exam


General Appearance: WD/WN, alert, no apparent distress


EENT: normal ENT inspection


Neck: normal inspection


Respiratory: lungs clear, normal breath sounds, No respiratory distress


Cardiac/Chest: regular rate, rhythm, No diastolic murmur, No systolic murmur


Abdomen: normal bowel sounds, non-tender, soft, No organomegaly, No hepatomegaly

, No splenomegaly


Back: Normal inspection


Skin: normal color, warm/dry


Extremities: No swelling


Neuro/Psych: no motor/sensory deficits, alert, normal mood/affect





ICD10 Worksheet


Patient Problems: 


 Problems











Problem Status Onset


 


Nausea & vomiting Acute  


 


Abdominal pain Acute  


 


Nausea Acute

## 2017-08-22 NOTE — CPEKG
Heart Rate: 131

RR Interval: 458

QRSD Interval: 90

QT Interval: 336

QTC Interval: 496

QRS Axis: 47

T Wave Axis: 50

EKG Severity - ABNORMAL ECG -

EKG Impression: ATRIAL FIBRILLATION

EKG Impression: BORDERLINE PROLONGED QT INTERVAL

EKG Impression: RAPID VENTRICULAR RESPONSE

Electronically Signed By: Wil Douglas 23-Aug-2017 15:29:39

## 2017-08-22 NOTE — HOSPPROG
Hospitalist Progress Note


Assessment/Plan: 


Assessment:  62-year-old male presents with acute on chronic nausea and vomiting





Plan:





1. Nausea and vomiting.  Acute, possible etiologies include acute intermittent 

porphyria versus irritable bowel syndrome versus renal pathology. 


-cath demonstrating no obstructive lesions, very unlikely that N/V is anginal 

equivalent


-CTA abd demonstrating no ischemia


-uPorphyria tests inappropriately handled, unable to run, will contact lab


-sPorphyria pending


-counseled patient/wife that symptom mgmt should be the focus at this time, and 

strong outpatient follow-up w/ GI and Renal are encouraged, since either IBS 

vs. IgA nephropathy are the most likely remaining diagnoses





2. Coronary artery disease.  Chronic, patient status post previous stent


-continue home medications including statin Plavix, beta-blocker





3. Paroxysmal atrial fibrillation.  Patient had 1 episode of provoked atrial 

fibrillation following adenosine stress test last hospitalization, was placed 

on systemic anticoagulation for possible TIA


-restart eliquis


-continue beta-blocker





Diet.  Cont reg diet





Prophylaxis.  Low risk patient, SCDs





Code.  Full





Disposition.  Anticipated discharge is 8/23, does not have safe PO intake at 

this time, requiring trial of anti-emetics








Subjective: remains nauseated, unable to tolerate PO


Objective: 


 Vital Signs











Temp Pulse Resp BP Pulse Ox


 


 36.8 C   57 L  12   149/91 H  99 


 


 08/22/17 16:13  08/22/17 17:53  08/22/17 17:53  08/22/17 17:53  08/22/17 17:53








 Laboratory Results





 08/22/17 04:43 





 08/22/17 04:43 





 











 08/21/17 08/22/17 08/23/17





 05:59 05:59 05:59


 


Intake Total 1000 2740 550


 


Output Total 300 0 275


 


Balance 700 2740 275








 











PT  15.4 SEC (12.0-15.0)  H  08/22/17  04:43    


 


INR  1.22  (0.83-1.16)  H  08/22/17  04:43    














- Time Spent With Patient


Time Spent with Patient: greater than 35 minutes


Time Spent with Patient: Greater than 35 minutes spent on this patients care, 

greater than 50% of time spent counseling, educating, and coordinating care 

regarding the above mentioned plan.





- Pending Discharge


Pending Discharge Within 24 Hours: Yes


Pending Discharge Date: 08/23/17


Pending Discharge Time: 11:00





- Physical Exam


Constitutional: not in pain, uncomfortable


Neurologic: AAOx3


Psychiatric: interacting appropriately, not anxious, not encephalopathic, 

thought process linear





ICD10 Worksheet


Patient Problems: 


 Problems











Problem Status Onset


 


Abdominal pain Acute  


 


Nausea Acute  


 


Nausea & vomiting Acute

## 2017-08-22 NOTE — CPIP
[f rep st]



                                                       INVASIVE CARDIAC PROCEDURE





DATE OF PROCEDURE:  08/22/2017



PROCEDURE:  

1.  Coronary angiography.

2.  Left ventriculography.



INDICATION:  

1.  Known coronary artery disease, status post stenting of the left anterior descending coronary art
antoine and right coronary artery in May of 2017.

2.  Recurrent nausea and vomiting, concerning for an anginal equivalent.



ACCESS:  The patient was prepped and draped in sterile fashion. 1% lidocaine was used to anesthetize
 the left radial region. A 5-Central African introducer sheath was placed selectively into the left radial ar
mike via modified Seldinger technique.



CORONARY ANGIOGRAPHY:  A 5-Central African JL3.5 catheter was advanced to the left main coronary artery and i
mages obtained. The left main coronary artery bifurcated into LAD and circumflex coronary arteries. 
The left main coronary artery had an acute takeoff, which resulted in the appearance of an ostial 30
% to 40% stenosis that was only appreciated in the cranial views. The left anterior descending coron
tyrell artery had mild diffuse disease throughout. In the proximal and mid segments, previously placed 
stents could be seen. The previously placed stents were widely patent, with no evidence of significa
nt in-stent restenosis. The left anterior descending coronary artery gave rise to 1 prominent diagon
al branch. The diagonal branch had a single discrete 30% stenosis in the mid vessel. The second diag
onal artery was relatively small and free of any significant disease. The circumflex coronary artery
 was a small vessel and gave rise to 1 posterior lateral branch. The circumflex coronary artery had 
a single discrete 30% stenosis in the proximal segment. A 6-Central African JR4 catheter was advanced to the 
right coronary artery and images obtained. The right coronary artery had mild diffuse disease throug
hout. In the proximal segment, a previously placed stent could be seen. The previously placed stent 
was widely patent, with no evidence of in-stent restenosis. In the mid to distal vessel, there was a
 single discrete 50% stenosis present.



LEFT VENTRICULOGRAPHY:  A 6-Central African pigtail catheter was advanced into the left ventricle and images 
obtained. Left ventricle was normal in size, had normal to hyperdynamic systolic function, estimated
 ejection fraction of 65%.



COMPLICATIONS:  None.



CONCLUSIONS:  

1.  Patent left anterior descending artery stents.

2.  Patent right coronary artery stent.

3.  Mild to moderate coronary artery disease without flow limitation.

4.  Normal left ventricular systolic function.

5.  Plan is for medical management.





Job #:  [id number]

## 2017-08-23 VITALS
RESPIRATION RATE: 12 BRPM | OXYGEN SATURATION: 92 % | HEART RATE: 57 BPM | TEMPERATURE: 97.7 F | DIASTOLIC BLOOD PRESSURE: 69 MMHG | SYSTOLIC BLOOD PRESSURE: 115 MMHG

## 2017-08-23 LAB
ANION GAP SERPL CALC-SCNC: 11 MEQ/L (ref 8–16)
C3 COMPLEMENT COMPONENT: 93 MG/DL (ref 75–175)
C4 COMPLEMENT COMPONENT: 25 MG/DL (ref 14–40)
CALCIUM SERPL-MCNC: 9.2 MG/DL (ref 8.5–10.4)
CHLORIDE SERPL-SCNC: 101 MEQ/L (ref 97–110)
CO2 SERPL-SCNC: 24 MEQ/L (ref 22–31)
CREAT SERPL-MCNC: 1 MG/DL (ref 0.7–1.3)
GBM AB SER QL: <0.2 U
GFR SERPL CREATININE-BSD FRML MDRD: > 60 ML/MIN/{1.73_M2}
GLUCOSE SERPL-MCNC: 101 MG/DL (ref 70–100)
HEPATITIS BS AB QUANT: <5 MIU/ML
IG KAPPA FREE LIGHT CHAIN: 1.61 MG/DL
IG LAMBDA FREE LIGHT CHAIN: 1.2 MG/DL
KAPPA LC/LAMBDA SER: 1.34 {RATIO}
PORPHYRINS SERPL-MCNC: < 1 MCG/DL (ref ?–1)
POTASSIUM SERPL-SCNC: 4.2 MEQ/L (ref 3.5–5.2)
SODIUM SERPL-SCNC: 136 MEQ/L (ref 134–144)

## 2017-08-23 RX ADMIN — CLOPIDOGREL BISULFATE SCH MG: 75 TABLET, FILM COATED ORAL at 08:19

## 2017-08-23 RX ADMIN — ATORVASTATIN CALCIUM SCH MG: 40 TABLET, FILM COATED ORAL at 09:06

## 2017-08-23 RX ADMIN — CARVEDILOL SCH MG: 3.12 TABLET, FILM COATED ORAL at 09:06

## 2017-08-23 RX ADMIN — FAMOTIDINE SCH MG: 20 TABLET, FILM COATED ORAL at 08:19

## 2017-08-23 RX ADMIN — CARVEDILOL SCH: 3.12 TABLET, FILM COATED ORAL at 00:40

## 2017-08-23 RX ADMIN — PANTOPRAZOLE SODIUM SCH MG: 40 TABLET, DELAYED RELEASE ORAL at 08:19

## 2017-08-23 RX ADMIN — PANTOPRAZOLE SODIUM SCH: 40 TABLET, DELAYED RELEASE ORAL at 00:40

## 2017-08-23 NOTE — PDDCSUM
Discharge Summary


Discharge Summary: 


DISCHARGE SUMMARY





FOLLOW-UP ITEMS: 


Follow-up urine porphobilinogen level, follow serum porphyrins level, follow-up 

hepatitis and SPEP/UPEP results





DATE OF ADMISSION:  8/20/17


DATE OF DISCHARGE: 8/23/17





DISCHARGE DIAGNOSES:


1. Acute nausea and vomiting


2. Chronic coronary artery disease


3. Paroxysmal atrial fibrillation


4. Chronic nephrolithiasis


5. Horseshoe kidney with chronic hematuria and proteinuria


6. Acute metabolic acidosis





CONSULTATIONS:


Cardiology, Nephrology





PROCEDURES / IMAGING:


Cardiac catheterization demonstrating patent stents, no flow-limiting stenosis, 

CT angiogram of the abdomen demonstrating no evidence bowel ischemia, horseshoe 

kidney with bilateral nephrolithiasis





CHIEF COMPLAINT:


Acute nausea and vomiting





SUBJECTIVE:


Patient is feeling well at time of discharge





PHYSICAL EXAM ON DISCHARGE:


Systolic blood pressure is 150-160, heart rate 70, afebrile overnight, satting 

well on room air, alert awake oriented x3, pain level 0/10





LABS ON DISCHARGE:


Creatinine 1 BUN 20 serum bicarb 24





HOSPITAL COURSE BY PROBLEM:


1. Acute nausea and vomiting.  Patient experienced an acute exacerbation of his 

chronic nausea and vomiting, most likely secondary to a functional bowel 

disorder, but acute intermittent porphyria was considered and appropriate lab 

tests were sent.  We ruled out the possibility of this being an anginal 

equivalent with a negative cardiac catheterization and we also ruled out bowel 

ischemia with a negative CT angiogram of the abdomen.  At this point, the 

patient has undergone extensive workup for his intermittent nausea and 

vomiting.  This includes a relatively normal gastric emptying study performed 

in June of 2017 as well as relatively unremarkable upper endoscopy from June of 2017. Outside of the rare possibility of acute intermittent porphyria, it seems 

that the patient's abdominal symptoms are most likely secondary to a functional 

issue and symptom management should be nexus of care.  The patient's wife 

received extensive education regarding this concept, and are prepared to 

utilize as needed medications at home to manage his symptoms.  The present time

, the patient is electing for Phenergan as first-line treatment, Ativan as 

second-line treatment, Thorazine as treatment for hiccups if necessary.  Given 

that reflux could be a contributing factor, the patient has been re-initiated 

on pantoprazole 40 mg daily.


2. Horseshoe kidney with hematuria and proteinuria. Patient was seen in 

consultation by Nephrology for his horseshoe kidney and significant proteinuria 

with hematuria.  Nephrology felt fairly confident that his abdominal symptoms 

were not secondary to either his renal cysts or his nephrolithiasis and that 

his proteinuria and hematuria may either be secondary to the kidney stones in 

the setting of systemic anticoagulation or an underlying undiagnosed IgA 

nephropathy.  They recommended that he follow up with them in clinic, and renal 

biopsy can be considered in the future when the patient is not on systemic 

anticoagulation.


3. Chronic coronary artery disease. We have effectively ruled out patient's 

nausea and vomiting as an anginal equivalent with a normal cardiac 

catheterization, conducted while patient was experiencing his symptoms.  

Patient has been continued on his statin, Plavix, beta-blocker, and ACE 

inhibitor has been added for blood pressure management.  No further cardiac 

workup is indicated at this time.


4. Paroxysmal atrial fibrillation.  Patient initially had 1 episode of provoked 

atrial fibrillation following adenosine stress test during his last 

hospitalization.  He was placed on systemic anticoagulation for possible TIA.  

He has been continued on his Eliquis and beta blocker.  He did have 1 run of 

atrial fibrillation during this hospitalization in the setting of nausea and 

vomiting symptoms.


5. Acute metabolic acidosis.  Secondary to nausea and vomiting, he received IV 

normal saline and this resolved





DISCHARGE MEDICATIONS:


Please see official discharge medication reconciliation sheet in chart , 

Phenergan as needed, Ativan as needed, Thorazine as needed, pantoprazole 

scheduled once daily, lisinopril 2.5 mg scheduled daily.





DISCHARGE INSTRUCTIONS:


Please follow up with GI of the [facility] next week, [facility] 

thereafter.





TIME SPENT:


Greater than 30 minutes were spent on direct patient care, as well as discharge 

planning and preparation.

## 2017-08-23 NOTE — SOAPPROG
SOAP Progress Note


Assessment/Plan: 


1. CAD - Pt has known CAD and is s/p PCI of his LAD and RCA.  Angiogram on 8/22/ 17 with patent stents and mild to moderate CAD with out flow limitation.  

Continue medical management with plavix, coreg, and lipitor.  Will start 

lisinopril as outlined below.





2. PAF - Pt has a history of PAF and is managed with a rate control and 

anticoagulation strategy.  His L radial access site looks good.  Will resume 

eliquis.





3. HTN - Pt reports a history of low BP.  His BP has been elevated since admit.

  Will start lisinopril 2.5 mg daily.





4. N/V - Pt has a history of episodic N/V thought to be a potential anginal 

equivalent.  Angiogram on 8/22 with symptoms of no significant flow limitation.

  Suspect N/V is not his anginal equivalent.








Subjective: 


No chest pain


No access site complications.


+ N/V and hiccups last pm


No orthopnea or PND.


Objective: 





 Vital Signs











Temp Pulse Resp BP Pulse Ox


 


 37.0 C   73   16   152/75 H  98 


 


 08/23/17 07:56  08/23/17 07:56  08/23/17 07:56  08/23/17 07:56  08/23/17 07:56








 Laboratory Results





 08/22/17 04:43 





 08/23/17 04:54 





 











 08/22/17 08/23/17 08/24/17





 05:59 05:59 05:59


 


Intake Total 2740 1050 


 


Output Total 0 275 


 


Balance 2740 775 








 











PT  15.4 SEC (12.0-15.0)  H  08/22/17  04:43    


 


INR  1.22  (0.83-1.16)  H  08/22/17  04:43    














Physical Exam





- Physical Exam


General Appearance: alert, no apparent distress


Respiratory: lungs clear


Cardiac/Chest: regular rate, rhythm


Extremities: other (No hematoma. 2+ radial pulse)


Neuro/Psych: alert





ICD10 Worksheet


Patient Problems: 


 Problems











Problem Status Onset


 


Nausea & vomiting Acute  


 


Abdominal pain Acute  


 


Nausea Acute

## 2017-08-23 NOTE — SOAPPROG
SOAP Progress Note


Assessment/Plan: 


Assessment:


1. hematuria: minimal proteinuria, creat normal. Serologies pending but most 

likely possibilities are IgA, thin basement membrane dz or intrarenal calculi. 

If serologies negative renal bx unlikely to  and in light of 

anti-platelet rx would be high-risk procedure. Should f/u with Dr. Whalen in 

approx 2 weeks to review serologies, but can likely f/u 1-2X/yr thereafter for 

monitoring of proteinuria, etc. He has office contact info.





2. htn: starting acei, which is reasonable.





3. n/v: this is unrelated to renal issues.





4. CAD: cath negative.





Will sign off























Plan:





08/23/17 13:09





Subjective: 





s/p cath yesterday. Feels well, hoping to go home later today.


Objective: 





 Vital Signs











Temp Pulse Resp BP Pulse Ox


 


 36.5 C   57 L  12   115/69   92 


 


 08/23/17 11:24  08/23/17 11:24  08/23/17 11:24  08/23/17 11:24  08/23/17 11:24








 Laboratory Results





 08/22/17 04:43 





 08/23/17 04:54 





 











 08/22/17 08/23/17 08/24/17





 05:59 05:59 05:59


 


Intake Total 2740 1050 


 


Output Total 0 275 


 


Balance 2740 775 








 











PT  15.4 SEC (12.0-15.0)  H  08/22/17  04:43    


 


INR  1.22  (0.83-1.16)  H  08/22/17  04:43    














Physical Exam





- Physical Exam


General Appearance: no apparent distress


Extremities: pedal edema (none)





ICD10 Worksheet


Patient Problems: 


 Problems











Problem Status Onset


 


Nausea & vomiting Acute  


 


Abdominal pain Acute  


 


Nausea Acute

## 2017-08-24 LAB
ANA SER QL: 0.25 UNITS (ref ?–1)
Lab: 0.49 %
Lab: 12 %
Lab: 12 %
Lab: 18 %
Lab: 26 %
Lab: 33 %

## 2018-01-02 ENCOUNTER — HOSPITAL ENCOUNTER (INPATIENT)
Dept: HOSPITAL 80 - FED | Age: 63
LOS: 2 days | Discharge: HOME | DRG: 392 | End: 2018-01-04
Attending: HOSPITALIST | Admitting: FAMILY MEDICINE
Payer: COMMERCIAL

## 2018-01-02 DIAGNOSIS — Z95.5: ICD-10-CM

## 2018-01-02 DIAGNOSIS — K31.89: Primary | ICD-10-CM

## 2018-01-02 DIAGNOSIS — R31.9: ICD-10-CM

## 2018-01-02 DIAGNOSIS — E86.9: ICD-10-CM

## 2018-01-02 DIAGNOSIS — Q63.1: ICD-10-CM

## 2018-01-02 DIAGNOSIS — E78.5: ICD-10-CM

## 2018-01-02 DIAGNOSIS — R11.2: ICD-10-CM

## 2018-01-02 DIAGNOSIS — I25.10: ICD-10-CM

## 2018-01-02 DIAGNOSIS — K21.9: ICD-10-CM

## 2018-01-02 DIAGNOSIS — I48.0: ICD-10-CM

## 2018-01-02 LAB — PLATELET # BLD: 271 10^3/UL (ref 150–400)

## 2018-01-02 NOTE — EDPHY
H & P


Stated Complaint: cyclical vomiting


HPI/ROS: 





HPI


The patient presents with nausea, vomiting which has been present since 5:00 

p.m. tonight and started suddenly.  He has had about 10 episodes of nonbloody, 

nonbilious emesis.  This has been associated with upper abdominal aching pains 

which began after the vomiting. He took 1 dose of Ativan 1 mg p.o. at home.  

His symptoms persisted and he feels terrible he says.  His wife called the on-

call gastroenterologist Dr. Granda is primary gastroenterologist and patient was 

instructed to come to the emergency department.  He says he has dark colored 

urine.  His last episode was 9 weeks ago and lasted for about 4 days, 

eventually improved on its own.  He was admitted to the hospital in August of 2017 for similar symptoms.  Cardiac workup was unrevealing.  The patient has 

had a Gastroenterology evaluation and symptoms seem to be due to cyclic 

vomiting syndrome as other causes been rule out.





REVIEW OF SYSTEMS


Constitutional:  No fever, no chills.


Eyes:  No discharge.


ENT:  No sore throat.


Cardiovascular:  No chest pain, no palpitations.


Respiratory:  No cough, no shortness of breath.


Gastrointestinal:  No abdominal pain, no vomiting.


Genitourinary:  No hematuria.


Musculoskeletal:  No back pain.


Skin:  No rashes.


Neurological:  No headache.





PMHx:  Recurrent nausea and vomiting, GERD, history of horseshoe kidney, CAD, 

paroxysmal atrial fibrillation





Soc Hx:  Lives at home with his wife, she is currently out of town in [place]








PHYSICAL


General Appearance: Alert, no distress


Eyes: Pupils equal and round no pallor or injection


ENT, Mouth: Mucous membranes dry


Respiratory: There are no retractions, lungs are clear to auscultation


Cardiovascular:  Regular rate and rhythm 


Gastrointestinal:  Abdomen is soft and non-tender, no masses, bowel sounds 

normal 


Neurological:  A&O, moves all extremities


Skin:  Warm and dry, no rashes


Musculoskeletal: Neck is supple non tender 


Extremities:  symmetrical, full range of motion 


Psychiatric:  Patient is oriented X 3, there is no agitation 





Source: Patient


Exam Limitations: No limitations





- Personal History


Current Tetanus Diphtheria and Acellular Pertussis (TDAP): No


Tetanus Vaccine Date: > 10





- Medical/Surgical History


Hx Asthma: No


Hx Chronic Respiratory Disease: No


Hx Diabetes: No


Hx Cardiac Disease: Yes


Hx Renal Disease: No


Hx Cirrhosis: No


Hx Alcoholism: No


Hx HIV/AIDS: No


Hx Splenectomy or Spleen Trauma: No


Other PMH: kidney cystLEFT KNEE SURGERY.  GERD.  recurrent n/v unknown 

etiology.  card stent, cyclical vomiting syndrome.





- Social History


Smoking Status: Former smoker


Constitutional: 


 Initial Vital Signs











Temperature (C)  36.5 C   01/02/18 22:57


 


Heart Rate  63   01/02/18 22:57


 


Respiratory Rate  16   01/02/18 22:57


 


Blood Pressure  168/90 H  01/02/18 22:57


 


O2 Sat (%)  96   01/02/18 22:57








 











O2 Delivery Mode               Room Air














Allergies/Adverse Reactions: 


 





No Known Allergies Allergy (Verified 05/09/17 11:54)


 








Home Medications: 














 Medication  Instructions  Recorded


 


Apixaban [Eliquis] 5 mg PO BID #60 tab 05/15/17


 


Atorvastatin Calcium [Lipitor 40 40 mg PO DAILY #30 tab 05/15/17





mg (*)]  


 


Carvedilol [Coreg (*)] 3.125 mg PO BIDMEAL 08/20/17


 


Clopidogrel Bisulfate [Clopidogrel] 75 mg pe PO DAILY 08/20/17


 


LORazepam [Ativan (*)] 0.5 - 1 mg PO Q4HRS PRN #40 tab 08/23/17


 


Lisinopril [Zestril 2.5 mg (*)] 2.5 mg PO DAILY #30 tab 08/23/17


 


Pantoprazole Sodium [Protonix 40mg 40 mg PO DAILY #30 tab 08/23/17





(*)]  














Medical Decision Making


Differential Diagnosis: 





62-year-old man, history of recurrent nausea and vomiting, presumed to be due 

to cyclic vomiting syndrome who presents from home with about 6 hr of severe 

nausea and vomiting. On exam, he is hypertensive, otherwise mucous membranes 

are dry, though abdominal exam is benign.





Differential diagnosis includes viral gastroenteritis, toxin mediated 

enterocolitis, cyclic vomiting syndrome.





In the emergency department, patient was given 2 L of IV fluid, Ativan, 

Phenergan.  He felt extremely nauseated and had no improvement in his symptoms. 

Labs were checked and did reveal slightly elevated BUN.  He did not feel well 

enough to go home.  I have discussed the case with Dr. Hardin of the hospitalist 

service and she will admit the patient.





- Data Points


Laboratory Results: 


 Laboratory Results





 01/02/18 23:11 





 01/02/18 23:11 





 











  01/02/18 01/02/18 01/02/18





  23:11 23:11 23:11


 


WBC      9.23 10^3/uL 10^3/uL





     (3.80-9.50) 


 


RBC      5.23 10^6/uL 10^6/uL





     (4.40-6.38) 


 


Hgb      16.7 g/dL g/dL





     (13.7-17.5) 


 


Hct      49.2 % %





     (40.0-51.0) 


 


MCV      94.1 fL fL





     (81.5-99.8) 


 


MCH      31.9 pg pg





     (27.9-34.1) 


 


MCHC      33.9 g/dL g/dL





     (32.4-36.7) 


 


RDW      12.8 % %





     (11.5-15.2) 


 


Plt Count      271 10^3/uL 10^3/uL





     (150-400) 


 


MPV      9.3 fL fL





     (8.7-11.7) 


 


Neut % (Auto)      87.2 % H %





     (39.3-74.2) 


 


Lymph % (Auto)      10.0 % L %





     (15.0-45.0) 


 


Mono % (Auto)      2.1 % L %





     (4.5-13.0) 


 


Eos % (Auto)      0.0 % L %





     (0.6-7.6) 


 


Baso % (Auto)      0.3 % %





     (0.3-1.7) 


 


Nucleat RBC Rel Count      0.0 % %





     (0.0-0.2) 


 


Absolute Neuts (auto)      8.05 10^3/uL H 10^3/uL





     (1.70-6.50) 


 


Absolute Lymphs (auto)      0.92 10^3/uL L 10^3/uL





     (1.00-3.00) 


 


Absolute Monos (auto)      0.19 10^3/uL L 10^3/uL





     (0.30-0.80) 


 


Absolute Eos (auto)      0.00 10^3/uL L 10^3/uL





     (0.03-0.40) 


 


Absolute Basos (auto)      0.03 10^3/uL 10^3/uL





     (0.02-0.10) 


 


Absolute Nucleated RBC      0.00 10^3/uL 10^3/uL





     (0-0.01) 


 


Immature Gran %      0.4 % %





     (0.0-1.1) 


 


Immature Gran #      0.04 10^3/uL 10^3/uL





     (0.00-0.10) 


 


Sodium    140 mEq/L mEq/L  





    (134-144)  


 


Potassium    4.4 mEq/L mEq/L  





    (3.5-5.2)  


 


Chloride    103 mEq/L mEq/L  





    ()  


 


Carbon Dioxide    23 mEq/l mEq/l  





    (22-31)  


 


Anion Gap    14 mEq/L mEq/L  





    (8-16)  


 


BUN    29 mg/dL H mg/dL  





    (7-23)  


 


Creatinine    1.1 mg/dL mg/dL  





    (0.7-1.3)  


 


Estimated GFR    > 60   





    


 


Glucose    159 mg/dL H mg/dL  





    ()  


 


Calcium    10.5 mg/dL H mg/dL  





    (8.5-10.4)  


 


Phosphorus  3.2 mg/dL mg/dL    





   (2.5-4.5)   


 


Magnesium  2.0 mg/dL mg/dL    





   (1.6-2.3)   


 


Total Bilirubin    0.9 mg/dL mg/dL  





    (0.1-1.4)  


 


Conjugated Bilirubin    0.3 mg/dL mg/dL  





    (0.0-0.5)  


 


Unconjugated Bilirubin    0.6 mg/dL mg/dL  





    (0.0-1.1)  


 


AST    31 IU/L IU/L  





    (17-59)  


 


ALT    45 IU/L IU/L  





    (21-72)  


 


Alkaline Phosphatase    82 IU/L IU/L  





    ()  


 


Troponin I  < 0.012 ng/mL ng/mL    





   (0.000-0.034)   


 


Total Protein    8.0 g/dL g/dL  





    (6.3-8.2)  


 


Albumin    4.9 g/dL g/dL  





    (3.5-5.0)  


 


Lipase    106 IU/L IU/L  





    ()  











Medications Given: 


 





Lorazepam (Ativan Injection)  0.5 - 1 mg IVP Q8HRS PRN


   PRN Reason: Anxiety, Unable to Take PO


   Stop: 07/02/18 00:33


   Last Admin: 01/03/18 05:49 Dose:  1 mg





Discontinued Medications





Sodium Chloride (Ns)  1,000 mls @ 0 mls/hr IV EDNOW ONE; Wide Open


   PRN Reason: Protocol


   Stop: 01/02/18 23:08


   Last Admin: 01/02/18 23:12 Dose:  1,000 mls


Sodium Chloride (Ns)  1,000 mls @ 0 mls/hr IV EDNOW ONE; Wide Open


   PRN Reason: Protocol


   Stop: 01/02/18 23:08


   Last Admin: 01/02/18 23:13 Dose:  1,000 mls


Lorazepam (Ativan Injection)  1 mg IVP EDNOW ONE


   Stop: 01/02/18 23:08


   Last Admin: 01/02/18 23:13 Dose:  1 mg


Promethazine HCl (Phenergan)  12.5 mg IVP EDNOW ONE


   Stop: 01/02/18 23:08


   Last Admin: 01/02/18 23:13 Dose:  12.5 mg








Departure





- Departure


Disposition: [facility]


Clinical Impression: 


Nausea & vomiting


Qualifiers:


 Vomiting type: unspecified Vomiting Intractability: intractable Qualified Code(

s): R11.2 - Nausea with vomiting, unspecified





Condition: Fair

## 2018-01-03 LAB — PLATELET # BLD: 227 10^3/UL (ref 150–400)

## 2018-01-03 RX ADMIN — CARVEDILOL SCH MG: 6.25 TABLET, FILM COATED ORAL at 18:12

## 2018-01-03 RX ADMIN — SODIUM CHLORIDE SCH MLS: 450 INJECTION, SOLUTION INTRAVENOUS at 13:15

## 2018-01-03 RX ADMIN — APIXABAN SCH MG: 5 TABLET, FILM COATED ORAL at 20:47

## 2018-01-03 NOTE — ASMTCMCOM
CM Note

 

CM Note                       

Notes:

1/3/2018 Case Management Note



Met w/pt and son Ralph ([phone]).



Pt reports that wife is currently in [place] taking care of her brother who has significant health 

issues.  She is planning on returning to [place] on Friday at 4:30 pm but is attempting to change her 

flights to return tomorrow.  Son Ralph plans to be with pt if he is discharged from the hospital 

before pt wife returns to [place].



Pt and son do not anticipate any discharge needs.



Case Management d/c poc:  home with family support with follow up as directed.



Case Management available if needs change.

 

Date Signed:  01/03/2018 12:19 PM

Electronically Signed By:Renu Carlson RN

## 2018-01-03 NOTE — CPEKG
Heart Rate: 64

RR Interval: 938

P-R Interval: 168

QRSD Interval: 106

QT Interval: 468

QTC Interval: 483

P Axis: 71

QRS Axis: 68

T Wave Axis: 74

EKG Severity - BORDERLINE ECG -

EKG Impression: SINUS RHYTHM

EKG Impression: BORDERLINE REPOL ABNORMALITY, ANT-LAT LEADS

EKG Impression: BORDERLINE PROLONGED QT INTERVAL

Electronically Signed By: Kaycee Tello 03-Jan-2018 07:27:56

## 2018-01-03 NOTE — CPEKG
Heart Rate: 85

RR Interval: 706

P-R Interval: 172

QRSD Interval: 102

QT Interval: 396

QTC Interval: 471

P Axis: 69

QRS Axis: 85

T Wave Axis: 48

EKG Severity - OTHERWISE NORMAL ECG -

EKG Impression: SINUS RHYTHM

EKG Impression: BORDERLINE RIGHT AXIS DEVIATION

EKG Impression: MINIMAL ST DEPRESSION, ANTEROLATERAL LEADS

Electronically Signed By: Zachary Pickens 03-Jan-2018 14:43:48

## 2018-01-03 NOTE — HOSPPROG
Hospitalist Progress Note


Assessment/Plan: 





N/V - persists, extensive outpt GI workup non-diagnostic.  Suspect [facility].  

Improving today.  


   ADAT


   prn anti-emetics





Volume depletion - IVF's.





Hematuria - mild, chronic in setting of horseshoe kidney


   monitor closely on eliquis





A fib - rate controlled, CVA prevention with eliquis





CAD - stabl,e, no CP, cont outpt meds





Full code





Dispo - change to inpt for ongoing evaluation and management of persistent 

nausea and vomiting


Subjective: Pt feels better.  Hasn't taken much po, but less N/V today.  No 

fevers.  Hematuria is decreasing, he reports this is chronic, mild, related to 

horseshoe kidney per urology.  No other bleeding.


Objective: 


 Vital Signs











Temp Pulse Resp BP Pulse Ox


 


 36.9 C   82   16   158/78 H  92 


 


 01/03/18 11:31  01/03/18 11:31  01/03/18 11:31  01/03/18 11:31  01/03/18 11:31








 Microbiology











 01/03/18 05:50 Respiratory Panel (PCR) - Final





 Nasal, Sinus - Swab    No Organism Detected








 Laboratory Results





 01/03/18 03:17 





 01/03/18 03:17 





 











 01/02/18 01/03/18 01/04/18





 05:59 05:59 05:59


 


Intake Total  2100 


 


Output Total  300 650


 


Balance  1800 -650














- Physical Exam


Constitutional: no apparent distress


Eyes: PERRL


Ears, Nose, Mouth, Throat: moist mucous membranes


Cardiovascular: regular rate and rhythym


Respiratory: no respiratory distress


Gastrointestinal: normoactive bowel sounds, soft, non-tender abdomen


Skin: warm


Musculoskeletal: full muscle strength


Neurologic: AAOx3


Psychiatric: interacting appropriately





ICD10 Worksheet


Patient Problems: 


 Problems











Problem Status Onset


 


Nausea & vomiting Acute  


 


Abdominal pain Acute  


 


Nausea Acute

## 2018-01-03 NOTE — GHP
[f rep st]



                                                            HISTORY AND PHYSICAL





DATE OF ADMISSION:  01/03/2018



SOURCE:  Patient able to provide most of the history.  He is a little bit somnolent following adminis
tration of Ativan and Phenergan in the emergency department.  His EMR was reviewed, including visits 
from March, May, and August of 2017.



CHIEF COMPLAINT:  Nausea, vomiting, abdominal pain.



HISTORY OF PRESENT ILLNESS:  This is a pleasant 62-year-old gentleman with a past medical history sig
nificant for coronary artery disease, who in May of 2017 had nausea, vomiting, consider anginal equiv
alent with requirement for 2-vessel stenting.  He also has a chronic history of intermittent episodes
 of nausea, vomiting, abdominal pain, for which a thorough GI evaluation was undertaken.  Patient is 
followed by GI of the [facility].  He also reports some subjective fevers, chills today with a cough and
 rhinorrhea.  He denies any rhinorrhea.  Has a mild sore throat.  He also reports that his nausea and
 vomiting started suddenly approximately 5 p.m. this evening.  He is currently at home with his wife 
being out of town.  He notified her, and she contacted GI on-call, who recommended patient go to the 
emergency department for evaluation.  Additionally, patient reports development of sudden gross hemat
uria, for which he has a history of cysts and horseshoe kidney.  Patient reports that he has had foll
owup with Nephrology, as well as Urology, and it is felt that his gross hematuria may be related to t
hese.  Patient also endorses epigastric pain that is associated with his nausea, as well as vomiting.
  Pain is cramping in nature.  He denies any diarrhea.  No melena or hematochezia.  Patient is on api
xaban for history of paroxysmal atrial fibrillation.  



Patient with a longstanding history of intermittent nausea and vomiting.  He was admitted in March 20
17 with these symptoms, required several days' hospital stay that was treated with Ativan, Thorazine,
 Phenergan.  He subsequently presented 2 months later with similar symptoms.  However, evaluation was
 significant for cardiac etiology of this with 2-vessel disease, for which patient underwent PTCA wit
h PCI to the LAD and RCA.  He presented again in August of 2017 with symptoms of nausea, vomiting.  C
ardiac etiology was ruled out with a cardiac cath that showed patent stents.  Patient also underwent 
gastric emptying studies, which were negative, and today patient reports sudden onset approximately 5
 p.m. of nausea, vomiting, belly pain.



REVIEW OF SYSTEMS:  Negative except as noted above.



ALLERGIES:  No known drug allergies.



HOME MEDICATIONS:  As per EMR, Protonix 40 mg p.o. daily; lisinopril 2.5 mg p.o. daily; Ativan 0.5 to
 1 mg p.o. q.4 hours p.r.n.; clopidogrel 75 mg p.o. daily; Coreg 3.125 mg p.o. b.i.d. with meals; issac
rvastatin 40 mg p.o. daily; and apixaban 5 mg p.o. b.i.d.



PAST MEDICAL HISTORY:  Significant for: 

1.  Coronary artery disease, status post stenting of LAD and RCA in August 2017.

2.  Chronic recurrent nausea and vomiting.

3.  Hematuria. 

4.  History of horseshoe kidney. 

5.  History of kidney and liver cysts.

6.  History of kidney stones. 

7.  History of esophagitis.

8.  Paroxysmal atrial fibrillation. 

9.  Benign essential hypertension.  

10.  Hyperlipidemia.



PAST SURGICAL HISTORY:  Significant for knee surgeries, cardiac cath with stenting in the LAD and RCA
 as noted above.



FAMILY HISTORY:  Mother with lupus.  Father with colon cancer.



SOCIAL HISTORY:  Patient is retired from computer sales.  He is , lives with his wife.  He flores
s not use tobacco or illicit drug use.  He does report drinking a few glasses of wine at every evenin
g meal.



CODE STATUS:  Full.



PHYSICAL EXAMINATION:  VITAL SIGNS:  Upon arrival to the emergency department, blood pressure 168/90,
 heart rate 63, respiratory rate 16, O2 sat is 96% on room air with a temperature 36.5.  Current shine
ls:  Blood pressure 166/76, heart rate is 57, respiratory rate 16, O2 saturation 93% on room air with
 a temperature 36.6.  GENERAL:  No acute distress until patient is woken.  Then does appear to be unc
omfortable, complaining of abdominal pain.  He rolls over and falls asleep intermittently.  HEAD:  No
rmocephalic, atraumatic.  EYES:  Limited exam secondary to patient's somnolence and desire to sleep. 
 No scleral icterus or conjunctival injection.  Pupils are equal, round, with decreased reactivity to
 light bilaterally, but symmetric.  ENT:  Mucous membranes appear dry.  Dentition is intact.  NECK:  
Supple.  Trachea midline.  CV:  Regular rate and rhythm.  Slightly bradycardic without murmurs, rubs,
 or gallops.  No chest wall tenderness to palpation.  RESPIRATORY:  Unlabored breathing.  Lungs clear
 to auscultation bilaterally.  No wheezes, rales, or rhonchi appreciated.  ABDOMEN:  Positive bowel s
ounds.  Soft.  Patient does complain of some mild tenderness to palpation in the epigastric region.  
There is no rebound, guarding, or masses appreciated.  Patient without significant grimacing or compl
aints with palpation as he had fallen asleep.  :  No suprapubic tenderness to palpation.  No [place] 
catheter in place.  EXTREMITIES:  Patient without any cyanosis, clubbing, or edema.  1+ pedal pulses 
bilaterally.  Patient moves all extremities while lying in bed.  Strength grossly normal, but exam is
 limited.  NEURO:  Grossly nonfocal.  No facial drooping.  Patient is somnolent, so exam is limited, 
but again appears nonfocal.  PSYCH:  Affect is flat.  Patient is somnolent.  Patient with limited 
peration.  He does roll over several times during the interview and tries to go to sleep, but does ap
pear fatigued.  He does not appear toxic.



LABORATORY DATA:  WBC 9.23, H and H 16.7 and 49.2, MCV of 94.1, platelet count is 271.  Neutrophil pe
rcent is 87.2.  No bands.  



Sodium is 140, potassium is 4.4, chloride 103, CO2 is 23, anion gap 14, BUN 29, creatinine is 1.1, GF
R greater than 60, glucose 159.  Calcium is 10.5, phos 3.2, and magnesium 2.0.  Total bilirubin 0.9, 
ALT is 45, AST is 31, alkaline phosphatase is 82.  Troponin is negative.  Total protein is 8.0, album
in 4.9, lipase 106. 



EKG:  Reviewed by me, sinus rhythm in the 60s.  Repolarization changes in the anterolateral leads wit
h less than 1 mm ST depression in the anterolateral leads.  There is no Q-waves.  No acute ST elevati
ons.  QTc is 43.



ASSESSMENT AND PLAN:  62-year-old gentleman with past medical history significant for coronary artery
 disease, recurrent nausea and vomiting, history of hematuria on apixaban for paroxysmal atrial fibri
llation, who presents with complaints of intractable nausea and vomiting.

1.  Patient with a longstanding history of intermittent episodes of nausea.  He has been evaluated by
 GI with extensive workup.  He has previously noted history of esophagitis, but most recently without
 any noted evidence of this.  He will continue on PPI.  Will advance diet as tolerated.  Currently, p
atient without any active vomiting.  Symptoms have improved status post Ativan and Phenergan.  Howeve
r, patient continues to complain of significant symptoms.  Again, he has not had any emesis.  No diar
estela.  A UA has been ordered; however, patient is yet to void.  He will continue to receive IV fluid 
supplementation and will observe for any evidence of an infectious process.  Also consider on differe
ntial possibility for nephrolithiasis of which patient has a history, but he is denying any flank shawna
n and his abdominal pain is localized to the epigastric region.  

2.  Epigastric pain, likely related to patient's nausea and vomiting, which patient states that this 
when it occurs.  Lipase from the emergency department is within normal limits.  No evidence of pancre
atitis.

3.  Dehydration.  Patient with an elevated BUN and stable creatinine.  Continue with IV fluids overni
ght.  Encourage diet as tolerated, but patient continues to have nausea and vomiting.  Will start wit
h some clears.  

4.  Hematuria.  Patient with history of horseshoe kidney, renal cysts, kidney stones, and likely rela
nas to the patient's anatomy deficiencies rather than acute nephrolithiasis.  The patient did have an
 evaluation and is followed by Nephrology and Urology without any recommendations for advanced change
s.  Patient has not yet voided.  Will check a UA, monitor H and H, and monitor for any episodes of fr
ank hematuria.  The patient is on apixaban for paroxysmal atrial fibrillation and stroke reduction bu
t may need to consider holding at this time.

5.  Hyperglycemia.  Patient without history of diabetes or hyperglycemia.  It is nonfasting.  Plan to
 repeat in the morning.  

6.  Elevated calcium.  Level is minimally elevated, likely related to patient's dehydration, hemoconc
entration.  Will plan to repeat a BMP in the morning.

7.  Chronic medical issues, coronary artery disease.  EKG is significant for some repolarization maradiaga
ges with less than 1 mm ST depressions in the anterolateral leads.  Patient denies any chest pain.  H
is initial troponin is negative.  He has a previous cath in August that was showing patent flow.  Pat
ient is compliant with his antiplatelet therapy, so lower suspicion for a reocclusion but will contin
ue to trend troponin at this time.

8.  Hyperlipidemia.  Resume statin.

9.  Benign essential hypertension.  Continue Coreg and lisinopril.

10.  Paroxysmal atrial fibrillation.  Continue Coreg and apixaban.

11.  Fluids, electrolytes, and nutrition.  Continue with IV fluids overnight.  Electrolyte replacemen
t if indicated.  Advance diet, starting with clears to cardiac as tolerated.

12.  Code status is full.  

13.  Prophylaxis.  Patient is already on apixaban at this time.  Will hold if any evidence of active 
bleeding.  Patient without complaints of active bleeding at this time.  Will plan to monitor vitals, 
repeat labs, and hold his Xarelto as needed.



DISPOSITION:  The patient is admitted to observation at this time on the medical floor.  Patient curr
ently in PCU overflow.





Job #:  [id number]

## 2018-01-03 NOTE — PDMN
Medical Necessity


Medical necessity: Patient meets inpatient criteri per physician note and MCG M-

370 Vomiting (vomiting that is severe or persistent/mult episodes since 

admission: presents w/epigastric pain, N/V, gross hematuria, subjective fever 

and chills; hx of CAD w/stenting in May, 2017 w/similar symptoms; anticipated 

LOS > 2 midnights for evaluation of persistent N/V, ongoing IV hydration and IV 

Reglan/Ativan.)

## 2018-01-04 VITALS
OXYGEN SATURATION: 98 % | DIASTOLIC BLOOD PRESSURE: 89 MMHG | HEART RATE: 73 BPM | RESPIRATION RATE: 12 BRPM | SYSTOLIC BLOOD PRESSURE: 102 MMHG

## 2018-01-04 VITALS — TEMPERATURE: 97.5 F

## 2018-01-04 LAB — PLATELET # BLD: 240 10^3/UL (ref 150–400)

## 2018-01-04 RX ADMIN — CARVEDILOL SCH MG: 6.25 TABLET, FILM COATED ORAL at 08:39

## 2018-01-04 RX ADMIN — APIXABAN SCH MG: 5 TABLET, FILM COATED ORAL at 08:38

## 2018-01-04 RX ADMIN — SODIUM CHLORIDE SCH MLS: 450 INJECTION, SOLUTION INTRAVENOUS at 09:46

## 2018-01-04 RX ADMIN — SODIUM CHLORIDE SCH MLS: 450 INJECTION, SOLUTION INTRAVENOUS at 12:36

## 2018-01-04 NOTE — GDS
[f rep st]



                                                             DISCHARGE SUMMARY





DISCHARGE DIAGNOSES:  

1.  Nausea and vomiting, likely secondary to cyclic vomiting syndrome, resolved.

2.  Volume depletion, resolved.

3.  Hematuria, improved.

4.  Atrial fibrillation.

5.  Coronary artery disease.



HISTORY OF DETAILS:  Please see the history and physical dated 01/03/2018.  In brief, Mr. Jazmine lai a 62-year-old male with a history of coronary artery disease with recent stenting in August 2017, p
aroxysmal atrial fibrillation and cyclic vomiting syndrome, who presented to the emergency department
 with nausea and vomiting.  His symptoms were refractory to treatment in the emergency department.  YASH gutierres was admitted for further management.



HOSPITAL COURSE:  Patient was admitted to the progressive care unit.  He has previously had extensive
 GI workup through GI of the [facility] and followed by Dr. Granda.  His workup has been unrevealing, and
 his condition is suspected to be secondary to cyclic vomiting syndrome.  He states his symptoms come
 every 9 weeks.  He was treated with supportive care, volume resuscitated with IV fluids, received an
tiemetics and Ativan for symptom control.  His symptoms did resolve the day after admission.  He was 
able to tolerate a regular diet.  He did have some mild hematuria on arrival, though this did not per
sist.  I visualized his urine in the urinal on the day of discharge, and there was no gross hematuria
 present.  He was continued on his Eliquis and Plavix, given his recent coronary stent.  He had no ch
est pain or anginal symptoms.



DISPOSITION:  Patient is discharged home in stable condition.



FOLLOWUP:  

1.  Dr. Renny Bagley, his primary care.

2.  Dr. Raheem Granda, [facility].



DISCHARGE MEDICATIONS:  Please see [employer] for computerized outpatient medication list.  Patient estella
l continue all previous outpatient medications as prescribed.  There are no new medications on discha
rge.





Job #:  [id number]

## 2018-01-05 NOTE — ASDISCHSUM
----------------------------------------------

Discharge Information

----------------------------------------------

Plan Status:Home with No Needs                       Medically Cleared to Leave:01/03/2018

Discharge Date:01/04/2018 03:50 PM                   CM D/C Disposition:Home, Routine, Self-Care

ADT D/C Disposition:Home, Routine, Self-Care         Projected Discharge Date:01/04/2018 12:00 AM

Transportation at D/C:Family                         Discharge Delay Reason:

Follow-Up Date:01/04/2018 12:00 AM                   Discharge Slot:

Final Diagnosis:

----------------------------------------------

Placement Information

----------------------------------------------

----------------------------------------------

Patient Contact Information

----------------------------------------------

Contact Name:TRICIA                          Relationship:Wife

Address:[address] [place] [address]                             Home [phone]

                                                     Work [phone]

City:[place]                                         Alternate Phone:

[facility]/Zip Code:[place] [address]                              Email:

----------------------------------------------

Financial Information

----------------------------------------------

Financial Class:HMO and PPO Plans

Primary Plan Desc:[employer] [employer]          Primary Plan Number:[id number]

Secondary Plan Desc:                                 Secondary Plan Number:

 

 

----------------------------------------------

Assessment Information

----------------------------------------------

----------------------------------------------

[facility] CM Progress Note

----------------------------------------------

CM Note

 

CM Note                       

Notes:

1/3/2018 Case Management Note



Met w/pt and son Ralph ([phone]).



Pt reports that wife is currently in [place] taking care of her brother who has significant health 

issues.  She is planning on returning to [place] on Friday at 4:30 pm but is attempting to change her 

flights to return tomorrow.  Son Ralph plans to be with pt if he is discharged from the hospital 

before pt wife returns to [place].



Pt and son do not anticipate any discharge needs.



Case Management d/c poc:  home with family support with follow up as directed.



Case Management available if needs change.

 

Date Signed:  01/03/2018 12:19 PM

Electronically Signed By:Renu Carlson RN

 

 

----------------------------------------------

Intervention Information

----------------------------------------------

## 2018-10-30 ENCOUNTER — HOSPITAL ENCOUNTER (OUTPATIENT)
Dept: HOSPITAL 80 - FSGY | Age: 63
Discharge: HOME | End: 2018-10-30
Payer: COMMERCIAL

## 2018-10-30 VITALS — SYSTOLIC BLOOD PRESSURE: 103 MMHG | DIASTOLIC BLOOD PRESSURE: 78 MMHG

## 2018-10-30 DIAGNOSIS — D21.21: Primary | ICD-10-CM

## 2018-10-30 DIAGNOSIS — Z95.5: ICD-10-CM

## 2018-10-30 PROCEDURE — 0JBQ0ZX EXCISION OF RIGHT FOOT SUBCUTANEOUS TISSUE AND FASCIA, OPEN APPROACH, DIAGNOSTIC: ICD-10-PCS

## 2018-10-30 NOTE — PDGENHP
History & Physical


Chief Complaint: pain right hallux., soft tissue mass right big toe


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 

myxoidfibroma


Pertinent Past, Social, Family History: none, non smoker


Relevant Physical Exam: pain right hallux to area of enlargment distal phalanx 

where there is a confirmed soft tissue mass


Cardiorespiratory Assessment: heart with RRR, lungs clear to auscultation, no 

congestion

## 2018-10-30 NOTE — PDANEPAE
ANE History of Present Illness





64 y/o male with foot mass needing excision, malignant





ANE Past Medical History





- Cardiovascular History


Hx Hypertension: No


Hx Arrhythmias: No


Hx Chest Pain: No


Hx Coronary Artery / Peripheral Vascular Disease: Yes


Hx CHF / Valvular Disease: No


Hx Palpitations: No


Cardiovascular History Comment: STENT X3 LAST 5/2017





- Pulmonary History


Hx COPD: No


Hx Asthma/Reactive Airway Disease: No


Hx Recent Upper Respiratory Infection: No


Hx Oxygen in Use at Home: No


Hx Sleep Apnea: No


Sleep Apnea Screening Result - Last Documented: Negative





- Neurologic History


Hx Cerebrovascular Accident: No


Hx Seizures: No


Hx Dementia: No





- Endocrine History


Hx Diabetes: No





- Renal History


Hx Renal Disorders: Yes


Renal History Comment: PREV KIDNEY STONE





- Liver History


Hx Hepatic Disorders: No





- Neurological & Psychiatric Hx


Hx Neurological and Psychiatric Disorders: No





- Cancer History


Hx Cancer: No





- Congenital Disorder History


Hx Congenital Disorders: Yes


Congenital History Comment: HORSESHOE SHAPED KIDNEY





- GI History


Gastrointestinal History Comment: INTERMITTENT N&V ON RX.  HAS BEEN IMPROVING 

WITH MEDICATION





- Other Health History


Other Health History: NONE





- Chronic Pain History


Chronic Pain: Yes (RT FOOT)





- Surgical History


Prior Surgeries: LT KNEE 1975.  TONSILLECTOMY





ANE Review of Systems


Review of Systems: 








- Exercise capacity


METS (RN): 6 METS





ANE Patient History





- Allergies


Allergies/Adverse Reactions: 








No Known Allergies Allergy (Verified 05/09/17 11:54)


 








- Home Medications


Home Medications: 








RX: Carvedilol [Coreg (*)] 6.25 mg PO BIDMEAL 01/03/18 [Last Taken 10/30/18 07:

30]


Desipramine HCl HS 09/28/18 [Last Taken 10/29/18]








- NPO status


NPO Since - Liquids (Date): 10/30/18


NPO Since - Liquids (Time): 08:30


NPO Since - Solids (Date): 10/29/18


NPO Since - Solids (Time): 21:00





- Smoking Hx


Smoking Status: Former smoker





ANE Labs/Vital Signs





- Vital Signs


Blood Pressure: 117/79


Heart Rate: 70


Respiratory Rate: 14


O2 Sat (%): 95


Height: 193.04 cm


Weight: 99.79 kg





ANE Physical Exam





- Airway


Neck exam: FROM


Mallampati Score: Class 2


Mouth exam: normal dental/mouth exam





- Pulmonary


Pulmonary: no rales or rhonchi, clear to auscultation





- Cardiovascular


Cardiovascular: regular rate and rhythym





- ASA Status


ASA Status: III





ANE Anesthesia Plan


Anesthesia Plan: MAC

## 2018-10-30 NOTE — GOP
DATE OF OPERATION:  10/30/2018



SURGEON:  Donna Wood DPM



ANESTHESIA:  IV sedation with local.



ANESTHESIOLOGIST:  Nisreen Greene MD



PREOPERATIVE DIAGNOSIS:  Painful soft tissue mass, right hallux.



POSTOPERATIVE DIAGNOSIS:  Painful soft tissue mass, right hallux.



PROCEDURE PERFORMED:  Excision of soft tissue mass, right hallux.



FINDINGS:  White firm well defined soft tissue mass adherent to bone.





DESCRIPTION OF PROCEDURE:  The patient presented to the hospital approximately 
an hour and a half prior to foot surgery after having been n.p.o. past 
midnight.  Patient's previous history and physicals in the last year reviewed 
and current history and physical, and state of health were all reviewed and 
there is no contraindication to the proposed procedure.  Patient was given 
cefazolin 2 g prior to surgery. 

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



Patient was taken to the OR room and placed on the OR table in a supine 
position and appropriate anesthetic agents were given.  This was supplemented 
with a local block to the right forefoot utilizing a total of 9 cc of 0.5% 
ropivacaine given in a Cox block fashion to the base of the 1st metatarsal.  
In addition, a total of 4 cc of 1% lidocaine plain was given to the base of the 
right hallux.  The right lower extremity was then prepped and draped in the 
usual aseptic fashion, covered with sterile stockinette.  Utilizing elevation 
overlying Esmarch bandage, the right foot was exsanguinated and the tourniquet 
was inflated to a pressure of 225 mmHg.  The foot was then lowered to the 
orthopedic table. 



Attention was then directed to the hallux where the medial edge of the right 
hallux nail plate was avulsed, removing approximately 1/8 of the nail plate, so 
as to allow more exposure of soft tissue for skin closure since the soft tissue 
mass was adjacent to the medial skin fold of the right hallux. 



Attention was then directed to the soft tissue mass where an approximate 3 cm 
linear longitudinal incision was made centered over the mass.  The incision was 
carefully deepened through the skin to the level of the soft tissue mass, which 
was immediately below the skin, taking care to preserve the neurovascular 
structures to keep the soft tissue mass intact.  The soft tissue mass was then 
gently freed from the surrounding soft tissue structures to the level of bone.  
The white fibrous mass was adherent to the bone and not easily .  An 
osteotome and mallet were then utilized to separate the soft tissue mass from 
the underlying distal phalanx, and then, it was placed on the back table and 
sent for pathologic evaluation.  Any prominent irregular bony borders were 
smoothened utilizing the rongeur.  The surgical site was inspected and no other 
abnormal tissue presented.  The underlying bone appeared normal.  



The surgical site was copiously irrigated with sterile saline bacitracin 
solution.  The tourniquet was released and there was immediate capillary refill 
to all digits.  The skin was reapproximated utilizing 4-0 Prolene with 
interrupted horizontal mattress sutures.  An additional 4 cc of 0.5% 
ropivacaine were given proximal to the surgical site.  A mildly compressive dry 
sterile gauze dressing was applied with Xeroform, 4 x 4, gauze, Jeff, and Ace 
bandage.  The patient tolerated the procedure and anesthesia well, transferred 
to the recovery room with vital signs stable and vascular status intact to the 
right lower extremity.  



In the recovery room, the patient received postoperative oral and written home 
care instructions.  The patient is permitted to bear weight as tolerated, but 
is to wear a Darco shoe at all times.  Prescription has been given for Vicodin 
to take postoperatively as prescribed for pain.  He was fitted and dispensed a 
Cryo/Cuff and instructed on its usage.  To follow up in 3 days at the office.  
Supposed to call earlier if any questions or problems should arise.





Job #:  [id number]

MTDD

## 2018-10-30 NOTE — POSTOPPROG
Post Op Note


Date of Operation: 10/30/18


Surgeon: Donna Wood


Assistant: none


Anesthesiologist: Dr. Greene


Pre-op Diagnosis: soft tissue mass right hallux


Post-op Diagnosis: same


Indication: pain


Procedure: excision of soft tissue mass right hallux


Findings: mass adherent to bone, firm


Inf/Abcess present in the surg proc area at time of surgery?: No


Depth: Superfical  (Skin SQ)


EBL: Minimal


Complications: 





none

## 2018-10-30 NOTE — PDHPUP
History & Physical Update


H&P update statement: 


This history and physical update is based on an assessment of the patient which 

was completed after admission or registration (within 24 hours), but prior to 

the surgery/procedure.


NO changes in health since last clearance. Heart and lungs checked, Normal RR, 

lungs clear.








H&P update: H&P reviewed & patient examined (no changes in health cleared for 

surgery. Past H&P's reviewed.), no change in patient's condition since H&P 

completed

## 2018-10-31 NOTE — POSTANESTH
Post Anesthetic Evaluation


Respiratory Status: Normal, Stable


Pain Control: Adequate, Prn Tx Ordered


Nausea/Vomiting Control: Adequate, Prn Tx Ordered


Complications Possibly Related to Anesthesia: None Noted (This  is a late entry 

for 10/30/18. I was involved with the patient's recovery and assessed him 

appropriately on that date.)

## 2019-02-18 ENCOUNTER — HOSPITAL ENCOUNTER (OUTPATIENT)
Dept: HOSPITAL 80 - BHFA | Age: 64
End: 2019-02-18
Attending: INTERNAL MEDICINE
Payer: COMMERCIAL

## 2019-02-18 DIAGNOSIS — I25.10: Primary | ICD-10-CM

## 2019-02-18 PROCEDURE — 78452 HT MUSCLE IMAGE SPECT MULT: CPT

## 2019-02-18 PROCEDURE — A9500 TC99M SESTAMIBI: HCPCS

## 2019-02-18 PROCEDURE — 93017 CV STRESS TEST TRACING ONLY: CPT

## 2021-10-16 ENCOUNTER — HOSPITAL ENCOUNTER (OUTPATIENT)
Dept: HOSPITAL 92 - ERS | Age: 66
Setting detail: OBSERVATION
LOS: 2 days | Discharge: HOME | End: 2021-10-18
Attending: SURGERY | Admitting: SURGERY
Payer: COMMERCIAL

## 2021-10-16 DIAGNOSIS — Z95.1: ICD-10-CM
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
## 2021-10-16 DIAGNOSIS — S80.02XA: ICD-10-CM

## 2021-10-16 DIAGNOSIS — Z79.01: ICD-10-CM

## 2021-10-16 DIAGNOSIS — M43.16: ICD-10-CM

## 2021-10-16 DIAGNOSIS — Z20.822: ICD-10-CM

## 2021-10-16 DIAGNOSIS — G89.11: ICD-10-CM

## 2021-10-16 DIAGNOSIS — S32.492A: ICD-10-CM

## 2021-10-16 DIAGNOSIS — I25.10: ICD-10-CM

## 2021-10-16 DIAGNOSIS — Z79.899: ICD-10-CM

## 2021-10-16 DIAGNOSIS — N28.1: ICD-10-CM

## 2021-10-16 DIAGNOSIS — S93.402A: ICD-10-CM

## 2021-10-16 DIAGNOSIS — S42.022A: Primary | ICD-10-CM

## 2021-10-16 PROCEDURE — 85025 COMPLETE CBC W/AUTO DIFF WBC: CPT

## 2021-10-16 PROCEDURE — G0378 HOSPITAL OBSERVATION PER HR: HCPCS

## 2021-10-16 PROCEDURE — 71045 X-RAY EXAM CHEST 1 VIEW: CPT

## 2021-10-16 PROCEDURE — 36415 COLL VENOUS BLD VENIPUNCTURE: CPT

## 2021-10-16 PROCEDURE — 72192 CT PELVIS W/O DYE: CPT

## 2021-10-16 PROCEDURE — G0390 TRAUMA RESPONS W/HOSP CRITI: HCPCS

## 2021-10-16 PROCEDURE — U0002 COVID-19 LAB TEST NON-CDC: HCPCS

## 2021-10-16 PROCEDURE — 96375 TX/PRO/DX INJ NEW DRUG ADDON: CPT

## 2021-10-16 PROCEDURE — 90732 PPSV23 VACC 2 YRS+ SUBQ/IM: CPT

## 2021-10-16 PROCEDURE — 83735 ASSAY OF MAGNESIUM: CPT

## 2021-10-16 PROCEDURE — G0009 ADMIN PNEUMOCOCCAL VACCINE: HCPCS

## 2021-10-16 PROCEDURE — G0008 ADMIN INFLUENZA VIRUS VAC: HCPCS

## 2021-10-16 PROCEDURE — 90662 IIV NO PRSV INCREASED AG IM: CPT

## 2021-10-16 PROCEDURE — 90471 IMMUNIZATION ADMIN: CPT

## 2021-10-16 PROCEDURE — 96372 THER/PROPH/DIAG INJ SC/IM: CPT

## 2021-10-16 PROCEDURE — 80048 BASIC METABOLIC PNL TOTAL CA: CPT

## 2021-10-16 PROCEDURE — 84100 ASSAY OF PHOSPHORUS: CPT

## 2021-10-16 PROCEDURE — 96374 THER/PROPH/DIAG INJ IV PUSH: CPT

## 2021-10-18 VITALS — TEMPERATURE: 98.4 F | DIASTOLIC BLOOD PRESSURE: 64 MMHG | SYSTOLIC BLOOD PRESSURE: 106 MMHG

## 2021-10-18 LAB
ANION GAP SERPL CALC-SCNC: 11 MMOL/L (ref 10–20)
BASOPHILS # BLD AUTO: 0 THOU/UL (ref 0–0.2)
BASOPHILS NFR BLD AUTO: 0.4 % (ref 0–1)
BUN SERPL-MCNC: 26 MG/DL (ref 8.4–25.7)
CALCIUM SERPL-MCNC: 8.4 MG/DL (ref 7.8–10.44)
CHLORIDE SERPL-SCNC: 107 MMOL/L (ref 98–107)
CO2 SERPL-SCNC: 22 MMOL/L (ref 23–31)
CREAT CL PREDICTED SERPL C-G-VRATE: 0 ML/MIN (ref 70–130)
EOSINOPHIL # BLD AUTO: 0.1 THOU/UL (ref 0–0.7)
EOSINOPHIL NFR BLD AUTO: 2.4 % (ref 0–10)
GLUCOSE SERPL-MCNC: 122 MG/DL (ref 80–115)
HGB BLD-MCNC: 12.4 G/DL (ref 14–18)
LYMPHOCYTES # BLD: 0.4 THOU/UL (ref 1.2–3.4)
LYMPHOCYTES NFR BLD AUTO: 12.5 % (ref 21–51)
MAGNESIUM SERPL-MCNC: 1.9 MG/DL (ref 1.6–2.6)
MCH RBC QN AUTO: 31.9 PG (ref 27–31)
MCV RBC AUTO: 96 FL (ref 78–98)
MONOCYTES # BLD AUTO: 0.2 THOU/UL (ref 0.11–0.59)
MONOCYTES NFR BLD AUTO: 6.1 % (ref 0–10)
NEUTROPHILS # BLD AUTO: 2.5 THOU/UL (ref 1.4–6.5)
NEUTROPHILS NFR BLD AUTO: 78.7 % (ref 42–75)
PLATELET # BLD AUTO: 150 THOU/UL (ref 130–400)
POTASSIUM SERPL-SCNC: 4.2 MMOL/L (ref 3.5–5.1)
RBC # BLD AUTO: 3.87 MILL/UL (ref 4.7–6.1)
SODIUM SERPL-SCNC: 136 MMOL/L (ref 136–145)
WBC # BLD AUTO: 3.2 THOU/UL (ref 4.8–10.8)